# Patient Record
Sex: MALE | Race: WHITE
[De-identification: names, ages, dates, MRNs, and addresses within clinical notes are randomized per-mention and may not be internally consistent; named-entity substitution may affect disease eponyms.]

---

## 2018-04-11 NOTE — RAD
2 views left hip  4/11/2018 2:00 AM



Indication: LEG AND BACK PAIN



Comparison: None



Findings: There is no fracture or dislocation identified. Articular surfaces

are uninterrupted. Soft tissues are unremarkable.



Impression: No evidence of acute osseous abnormality

## 2018-04-11 NOTE — RAD
2 views left knee  4/11/2018 2:00 AM



Indication: LEG AND BACK PAIN



Comparison: None



Findings: There is no fracture or dislocation identified. Articular surfaces

are uninterrupted. Soft tissues are unremarkable.



Impression: No evidence of acute osseous abnormality

## 2018-04-11 NOTE — RAD
4 views of the lumbar spine 4/11/2018



Indication: Back pain



Comparison study: None



Findings: Hypoplastic ribs appear to be present at the T 12 level.  No

evidence of acute fracture or alignment abnormality is identified.  Vertebral

body heights are preserved.  There is diffuse disc space narrowing most

prominent at L4-L5 and L5-S1.  There is facet arthrosis at these levels. 

There is a grade 1 retrolisthesis of L4 on L5.  Posterior projecting

osteophyte noted.  Findings could result in neural foraminal narrowing at

L4-L5 and L5-S1.  Given degree of facet arthrosis, some component of spinal

stenosis may be present.



Impression: No acute osseous abnormality is identified.  Degenerative changes

of the lumbar spine as described most prominent at L4-L5 and L5-S1.  Consider

MRI for further evaluation if clinical concern persists.

## 2018-04-12 NOTE — ED.ADGEN
Past History


Past Medical History:  COPD, Sinusitis, Other


Past Surgical History:  No Surgical History


Smoking:  Cigarettes


Alcohol Use:  None


Drug Use:  None





Adult General


HPI


HPI





Patient is a 49 yo male who states he missed his step at home and fell down 8 

stairs.  he fell to his right knee then to his back against the stair.  he has 

known bulging disks and DDD.  he denies being on narcotics.  he was able to 

stand and walk to car.  no leg weakness, no change in sensation





Review of Systems


Review of Systems





Constitutional: Denies fever or chills []


Eyes: Denies change in visual acuity, redness, or eye pain []


HENT: Denies nasal congestion or sore throat []


Respiratory: Denies cough or shortness of breath []


Cardiovascular: No additional information not addressed in HPI []


GI: Denies abdominal pain, nausea, vomiting, bloody stools or diarrhea []


: Denies dysuria or hematuria []


Musculoskeletal:pain in lumbar spine and right knee


Integument: Denies rash or skin lesions []


Neurologic: Denies headache, focal weakness or sensory changes []


Endocrine: Denies polyuria or polydipsia []





All other systems were reviewed and found to be within normal limits, except as 

documented in this note.





Current Medications


Current Medications





Current Medications








 Medications


  (Trade)  Dose


 Ordered  Sig/Sylvester  Start Time


 Stop Time Status Last Admin


Dose Admin


 


 Fentanyl Citrate


  (Fentanyl 2ml


 Vial)  75 mcg  1X  ONCE  4/12/18 20:15


 4/12/18 20:16 DC 4/12/18 20:20


75 MCG











Allergies


Allergies





Allergies








Coded Allergies Type Severity Reaction Last Updated Verified


 


  Penicillins Allergy Severe anaphylaxis 1/7/16 Yes











Physical Exam


Physical Exam





Constitutional: Well developed, well nourished, no acute distress, non-toxic 

appearance. []


HENT: Normocephalic, atraumatic, bilateral external ears normal, oropharynx 

moist, no oral exudates, nose normal. []


Eyes: PERRLA, EOMI, conjunctiva normal, no discharge. [] 


Neck: Normal range of motion, no tenderness, supple, no stridor. no midline pain


Cardiovascular:Heart rate regular rhythm, no murmur []


Lungs & Thorax:  Bilateral breath sounds clear to auscultation []


Abdomen: Bowel sounds normal, soft, no tenderness, no masses, no pulsatile 

masses. [] 


Skin: Warm, dry, no erythema, no rash. [] 


Back: tender along the entire lumbar spine. no cervical or thoracic pain


Extremities: no edema, no swelling of right knee. no ecchymosis.  full ROM of 

right knee. no crepitus with flexion/extension. 2/4 patellar reflexes. normal 

pedal pulses. cap refill <2 sec


Neurologic: Alert and oriented X 3, normal motor function, normal sensory 

function, no focal deficits noted. []


Psychologic: Affect normal, judgement normal, mood normal. []





Current Patient Data


Vital Signs





 Vital Signs








  Date Time  Temp Pulse Resp B/P (MAP) Pulse Ox O2 Delivery O2 Flow Rate FiO2


 


4/12/18 20:20   20  96 Room Air  











EKG


EKG


[]





Radiology/Procedures


Radiology/Procedures


[]





Course & Med Decision Making


Course & Med Decision Making


Pertinent Labs and Imaging studies reviewed. (See chart for details)


no fractures seen.  he has a pcp that he is following for his back.  he will 

see him in 2 weeks. he knows to return if trouble urinating or having bowel 

movements, weakness or decreased sensation. hydrocodone for pain and flexeril 

for relaxation





[]





Final Impression


Final Impression


muscle spasm due to fall[]


Problems:  





Dragon Disclaimer


Dragon Disclaimer


This electronic medical record was generated, in whole or in part, using a 

voice recognition dictation system.





Departure


Time of Disposition:  21:39


Condition:  STABLE


Patient Instructions:  Muscle Strain





Additional Instructions:  


ice back and knee. hydrocodone for pain, ibuprofen for inflammation. flexeril 

for relaxation. return if symptoms worsen. see your doctor in 2 weeks


Prescriptions


hydrocodone and flexeril











RICARDA ROJAS MD Apr 12, 2018 21:40

## 2018-04-12 NOTE — RAD
Indication: Trauma. Lower back tenderness and pain.

 

TECHNIQUE: CT lumbar spine without IV contrast with multiplanar reformats.

 

COMPARISON: None

 

FINDINGS:

There are 5 lumbar type vertebral bodies. No compression deformities. 

Facet joints are in normal anatomic alignment. Moderate-sized anterior 

osteophytes are seen at L1-L2 and L3-L4.

 

Segmental analysis:

L1-L2: Mild circumferential disc bulge flattening anterior thecal sac. 

Mild bilateral facet arthropathy. No neuroforamina narrowing.

L2-L3: Mild circumferential disc bulge flattening intrathecal sac. Mild 

bilateral facet arthropathy. No neuroforamina narrowing.

L3-L4: Mild circumferential disc bulge flattening intrathecal sac. No 

facet arthropathy. Moderate right and left neuroforamina narrowing.

L4-L5: Circumferential disc bulge with superimposed left paracentral disc 

protrusion narrowing spinal canal. Mild bilateral facet arthropathy. 

Severe left and moderate right neuroforamina narrowing.

 

L5-S1: No disc bulge or herniation. Mild bilateral facet arthropathy. 

Severe left and moderate right neuroforamina narrowing. No acute 

fractures. Visualized soft tissues in the abdomen and pelvis are within 

normal limits.

 

IMPRESSION:

1. No acute fractures.

2. Multilevel degenerative disc disease causing varying amount of 

neuroforaminal narrowing as described above.

 

Electronically signed by: Mega Bass DO (4/12/2018 9:12 PM) [facility]

## 2018-04-13 NOTE — RAD
3 views of each knee  4/12/2018 10:04 PM



Indication: RIGHT KNEE PAIN AFTER FALL



Comparison: None



Findings: There is no fracture or dislocation identified. Articular surfaces

are uninterrupted. Soft tissues are unremarkable.



Impression: No evidence of acute osseous abnormality

## 2018-06-08 VITALS
SYSTOLIC BLOOD PRESSURE: 126 MMHG | SYSTOLIC BLOOD PRESSURE: 126 MMHG | DIASTOLIC BLOOD PRESSURE: 70 MMHG | SYSTOLIC BLOOD PRESSURE: 126 MMHG | SYSTOLIC BLOOD PRESSURE: 126 MMHG | SYSTOLIC BLOOD PRESSURE: 126 MMHG | DIASTOLIC BLOOD PRESSURE: 70 MMHG | SYSTOLIC BLOOD PRESSURE: 126 MMHG | DIASTOLIC BLOOD PRESSURE: 70 MMHG | SYSTOLIC BLOOD PRESSURE: 126 MMHG | DIASTOLIC BLOOD PRESSURE: 70 MMHG | SYSTOLIC BLOOD PRESSURE: 126 MMHG | SYSTOLIC BLOOD PRESSURE: 126 MMHG | SYSTOLIC BLOOD PRESSURE: 126 MMHG | DIASTOLIC BLOOD PRESSURE: 70 MMHG | DIASTOLIC BLOOD PRESSURE: 70 MMHG | DIASTOLIC BLOOD PRESSURE: 70 MMHG | DIASTOLIC BLOOD PRESSURE: 70 MMHG | DIASTOLIC BLOOD PRESSURE: 70 MMHG | DIASTOLIC BLOOD PRESSURE: 70 MMHG

## 2018-06-08 NOTE — ED.ADGEN
Past History


Past Medical History:  COPD, CVA, Sciatica, Sinusitis, Other


Past Surgical History:  No Surgical History, Other


Smoking:  Cigarettes


Alcohol Use:  None


Drug Use:  None





Adult General


Chief Complaint


Chief Complaint


".. I have chronic low back pain... "





[facility]


HPI





Patient is a 48 year old male who presents with above hx and complaints of 

exacerbation of his chronic back pain.  Pt. denies any injury in the last 

couple days. Patient denies any fever or chills. Patient denies any IV drug 

use. Patient denies any history of immunosuppression. Patient denies any travel 

or specific ill contacts. Patient has been seen in pain clinic .   Patient 

denies any problems with defecation or urination. Patient states that bending 

or lifting does exacerbate pain. Patient's pain follows the left sciatic nerve 

is previous exacerbations. Patient has had a myelogram with MRI which showed 

degenerative joint disease and disc degenerative changes.  She does follow with 

Andriy and Dr Blake.   Pt. does continue to smoke.





Review of Systems


Review of Systems





Constitutional: Denies fever or chills []


Eyes: Denies change in visual acuity, redness, or eye pain []


HENT: Denies nasal congestion or sore throat []


Respiratory: Denies cough or shortness of breath []


Cardiovascular: No additional information not addressed in HPI []


GI: Denies abdominal pain, nausea, vomiting, bloody stools or diarrhea []


: Denies dysuria or hematuria [


Integument: Denies rash or skin lesions []


Neurologic: Denies headache, focal weakness or sensory changes []


Endocrine: Denies polyuria or polydipsia []





All other systems were reviewed and found to be within normal limits, except as 

documented in this note.





Family History


Family History


Non-contributory





Current Medications


Current Medications





Current Medications








 Medications


  (Trade)  Dose


 Ordered  Sig/Sylvester  Start Time


 Stop Time Status Last Admin


Dose Admin


 


 Ketorolac


 Tromethamine


  (Toradol)  60 mg  1X  ONCE  6/8/18 22:15


 6/8/18 22:16 DC 6/8/18 22:11


60 MG


 


 Lorazepam


  (Ativan)  2 mg  1X  ONCE  6/8/18 22:15


 6/8/18 22:16 DC 6/8/18 22:08


2 MG


 


 Methylprednisolone


 Acetate


  (DEPO-Medrol IM)  40 mg  1X  ONCE  6/8/18 22:15


 6/8/18 22:16 DC 6/8/18 22:10


40 MG


 


 Morphine Sulfate


  (Morphine 10mg


 Syringe)  10 mg  1X  ONCE  6/8/18 22:15


 6/8/18 22:16 DC 6/8/18 22:15


10 MG


 


 Orphenadrine


 Citrate


  (Norflex)  60 mg  1X  ONCE  6/8/18 22:15


 6/8/18 22:16 DC 6/8/18 22:13


60 MG











Allergies


Allergies





Allergies








Coded Allergies Type Severity Reaction Last Updated Verified


 


  Penicillins Allergy Severe anaphylaxis 1/7/16 Yes











Physical Exam


Physical Exam





Constitutional: Moderately  acute distress, non-toxic appearance. []


HENT: Normocephalic, atraumatic, bilateral external ears normal, oropharynx 

moist, no oral exudates, nose normal. []


Eyes: PERRLA, EOMI, conjunctiva normal, no discharge. [] 


Neck: Normal range of motion, no tenderness, supple, no stridor. [] 


Cardiovascular:Heart rate regular rhythm, no murmur []


Lungs & Thorax:  Bilateral breath sounds with scattered wheezing on  

auscultation []


Abdomen: Bowel sounds normal, soft, no tenderness, no masses, no pulsatile 

masses.  Obese.  Pt. decline rectal exam at this time. 


Skin: Warm, dry, no erythema, no rash. [] 


Back:Lumbar muscle  tenderness, no CVA tenderness. [] Pain that follows the Lt. 

sciatic nerve. Note muscles spasms in lumbar. 


Extremities: No tenderness, no cyanosis, no clubbing, ROM intact, no edema. [] 


Neurologic: Alert and oriented X 3, No gross motor function deficits,  no gross 

sensory function deficits no focal deficits noted. []No saddle loss.  DTR + 2 

patella and ankle.   


Psychologic: Affect anxious, judgement normal, mood normal. []





Current Patient Data


Vital Signs





 Vital Signs








  Date Time  Temp Pulse Resp B/P (MAP) Pulse Ox O2 Delivery O2 Flow Rate FiO2


 


6/8/18 22:15  74 16 126/70 (88) 97 Room Air  


 


6/8/18 21:35 98.2       











EKG


EKG


[]





Radiology/Procedures


Radiology/Procedures


[]





Course & Med Decision Making


Course & Med Decision Making


Pertinent Labs and Imaging studies reviewed. (See chart for details).  Patient 

follow-up as scheduled with his pain clinic and primary. Patient take home meds 

as directed. Patient to use ice packs as needed. Patient to take Tylenol and 

ibuprofen for pain. For marked pain may take Vicoprofen up 4 times a day. 

Patient must follow-up. Patient encouraged to stop smoking. Patient return if 

any concerns.





[]





Final Impression


Final Impression


1. Acute on Chronic Back pain[]-sciatica


2. COPD


3. Tobacco Abuse





Dragon Disclaimer


Dragon Disclaimer


This electronic medical record was generated, in whole or in part, using a 

voice recognition dictation system.











DELBERT CEJA MD Jun 8, 2018 21:42

## 2018-10-17 ENCOUNTER — HOSPITAL ENCOUNTER (OUTPATIENT)
Dept: HOSPITAL 63 - ECHO | Age: 48
Discharge: HOME | End: 2018-10-17
Payer: MEDICARE

## 2018-10-17 DIAGNOSIS — F17.290: ICD-10-CM

## 2018-10-17 DIAGNOSIS — I25.119: Primary | ICD-10-CM

## 2018-10-17 DIAGNOSIS — E66.8: ICD-10-CM

## 2018-10-17 PROCEDURE — 93306 TTE W/DOPPLER COMPLETE: CPT

## 2018-10-17 NOTE — CARD
MR#: [id number]

Account#: [id number]

Accession#: [id number]

Date of Study: 10/17/2018

Ordering Physician: SHAHBAZ HAND, 

Referring Physician: SHAHBAZ HAND 

Tech: Gissel Gloria RDCS





--------------- APPROVED REPORT --------------





EXAM: Two-dimensional and M-mode echocardiogram with Doppler and color Doppler.



Other Information 

Quality : Fair



INDICATION

Cardiac Disease: CAD 



RISK FACTORS

Obesity   



2D DIMENSIONS 

RVDd2.8 (2.9-3.5cm)Left Atrium(2D)3.8 (1.6-4.0cm)

IVSd1.0 (0.7-1.1cm)Aortic Root(2D)3.3 (2.0-3.7cm)

LVDd5.1 (3.9-5.9cm)LVOT Diameter2.2 (1.8-2.4cm)

PWd1.1 (0.7-1.1cm)LVDs3.9 (2.5-4.0cm)

FS (%) 25.0 %SV57.9 ml



Aortic Valve

AoV Peak Demetrio.156.0cm/sAoV VTI22.8cm

AO Peak GR.9.7mmHgLVOT Peak Demetrio.120.7cm/s

LVOT  VTI 21.36cmAO Mean GR.4mmHg

FAUZIA (VMAX)2.80xa3JFU   (VTI)3.46cm2



Mitral Valve

MV E Exhdxwtb28.7cm/sMV DECEL GBOG219tr

MV A Tzrczkjv28.6cm/sE/A  Ratio1.4



Pulmonary Valve

PV Peak Gfbnhfkn675.2cm/sPV Peak Grad.17mmHg



Pulmonary Vein

S1 Ozwaqnjd38.5cm/sD2 Oqsumsnk92.1cm/s



 LEFT VENTRICLE 

The left ventricle is normal size. There is normal left ventricular wall thickness. Left ventricle sy
stolic function is normal. The Ejection Fraction is 55-60%. There is normal LV segmental wall motion.
 Transmitral Doppler flow pattern is Grade II-pseudonormal filling dynamics.



 RIGHT VENTRICLE 

The right ventricle is normal size. The right ventricular systolic function is normal.



 ATRIA 

The left atrium size is normal. The right atrium size is normal. The interatrial septum is intact wit
h no evidence for an atrial septal defect or patent foramen ovale as noted on 2-D or Doppler imaging.




 AORTIC VALVE 

The aortic valve is not well visualized but appears to be functioning normally by Doppler interrogati
on. Doppler and Color Flow revealed no significant aortic regurgitation. There is no significant aort
ic valvular stenosis.



 MITRAL VALVE 

The mitral valve is normal in structure and function. There is no evidence of mitral valve prolapse. 
There is no mitral valve stenosis. Doppler and Color Flow revealed no mitral valve regurgitation note
d.



 TRICUSPID VALVE 

The tricuspid valve is normal in structure and function. Doppler and Color Flow revealed trace tricus
pid valve regurgitation. There is no tricuspid valve stenosis.



 PULMONIC VALVE 

The pulmonic valve is not well visualized. Doppler and Color Flow revealed no pulmonic valvular regur
gitation. There is no pulmonic valvular stenosis.



 GREAT VESSELS 

The aortic root is normal in size. The ascending aorta is not well seen. The IVC was not visualized.



 PERICARDIAL EFFUSION 

There is no evidence of significant pericardial effusion.



Critical Notification

Critical Value: No



<Conclusion>

The left ventricle is normal size.

Left ventricle systolic function is normal.

The Ejection Fraction is 55-60%.

There is no significant aortic valvular stenosis.

Doppler and Color Flow revealed no significant aortic regurgitation.

Doppler and Color Flow revealed no mitral valve regurgitation noted.

Doppler and Color Flow revealed trace tricuspid valve regurgitation.



Signed by : Rafy Dial MD

Electronically Approved : 10/17/2018 15:19:12

## 2018-10-26 ENCOUNTER — HOSPITAL ENCOUNTER (OUTPATIENT)
Dept: HOSPITAL 63 - SURG | Age: 48
Discharge: HOME | End: 2018-10-26
Attending: ANESTHESIOLOGY
Payer: MEDICARE

## 2018-10-26 DIAGNOSIS — I10: ICD-10-CM

## 2018-10-26 DIAGNOSIS — F17.210: ICD-10-CM

## 2018-10-26 DIAGNOSIS — Z88.0: ICD-10-CM

## 2018-10-26 DIAGNOSIS — I25.10: ICD-10-CM

## 2018-10-26 DIAGNOSIS — G47.30: ICD-10-CM

## 2018-10-26 DIAGNOSIS — Z79.82: ICD-10-CM

## 2018-10-26 DIAGNOSIS — J44.9: ICD-10-CM

## 2018-10-26 DIAGNOSIS — M47.26: Primary | ICD-10-CM

## 2018-10-26 DIAGNOSIS — Z86.73: ICD-10-CM

## 2018-10-26 DIAGNOSIS — Z79.899: ICD-10-CM

## 2018-10-26 DIAGNOSIS — M19.90: ICD-10-CM

## 2018-10-26 PROCEDURE — 62323 NJX INTERLAMINAR LMBR/SAC: CPT

## 2018-12-06 ENCOUNTER — HOSPITAL ENCOUNTER (OUTPATIENT)
Dept: HOSPITAL 63 - SURG | Age: 48
Discharge: HOME | End: 2018-12-06
Attending: ANESTHESIOLOGY
Payer: MEDICARE

## 2018-12-06 DIAGNOSIS — M47.26: Primary | ICD-10-CM

## 2018-12-06 DIAGNOSIS — Z88.0: ICD-10-CM

## 2018-12-06 DIAGNOSIS — G47.30: ICD-10-CM

## 2018-12-06 DIAGNOSIS — J44.9: ICD-10-CM

## 2018-12-06 DIAGNOSIS — I10: ICD-10-CM

## 2018-12-06 DIAGNOSIS — M19.90: ICD-10-CM

## 2018-12-06 DIAGNOSIS — F11.90: ICD-10-CM

## 2018-12-06 DIAGNOSIS — G89.4: ICD-10-CM

## 2018-12-06 DIAGNOSIS — Z79.82: ICD-10-CM

## 2018-12-06 DIAGNOSIS — I25.10: ICD-10-CM

## 2018-12-06 DIAGNOSIS — Z79.899: ICD-10-CM

## 2018-12-06 DIAGNOSIS — F17.210: ICD-10-CM

## 2018-12-06 PROCEDURE — 62323 NJX INTERLAMINAR LMBR/SAC: CPT

## 2019-01-24 ENCOUNTER — HOSPITAL ENCOUNTER (OUTPATIENT)
Dept: HOSPITAL 63 - SURG | Age: 49
Discharge: HOME | End: 2019-01-24
Attending: ANESTHESIOLOGY
Payer: MEDICARE

## 2019-01-24 DIAGNOSIS — Z88.0: ICD-10-CM

## 2019-01-24 DIAGNOSIS — Z86.73: ICD-10-CM

## 2019-01-24 DIAGNOSIS — J44.9: ICD-10-CM

## 2019-01-24 DIAGNOSIS — G47.30: ICD-10-CM

## 2019-01-24 DIAGNOSIS — M19.90: ICD-10-CM

## 2019-01-24 DIAGNOSIS — Z79.899: ICD-10-CM

## 2019-01-24 DIAGNOSIS — F17.210: ICD-10-CM

## 2019-01-24 DIAGNOSIS — I25.10: ICD-10-CM

## 2019-01-24 DIAGNOSIS — I10: ICD-10-CM

## 2019-01-24 DIAGNOSIS — Z79.82: ICD-10-CM

## 2019-01-24 DIAGNOSIS — M47.26: Primary | ICD-10-CM

## 2019-01-24 PROCEDURE — 62323 NJX INTERLAMINAR LMBR/SAC: CPT

## 2019-02-21 ENCOUNTER — HOSPITAL ENCOUNTER (OUTPATIENT)
Dept: HOSPITAL 63 - SURG | Age: 49
Discharge: HOME | End: 2019-02-21
Attending: ANESTHESIOLOGY
Payer: MEDICARE

## 2019-02-21 DIAGNOSIS — J44.9: ICD-10-CM

## 2019-02-21 DIAGNOSIS — M19.90: ICD-10-CM

## 2019-02-21 DIAGNOSIS — G47.30: ICD-10-CM

## 2019-02-21 DIAGNOSIS — K44.9: ICD-10-CM

## 2019-02-21 DIAGNOSIS — F11.90: ICD-10-CM

## 2019-02-21 DIAGNOSIS — G89.4: ICD-10-CM

## 2019-02-21 DIAGNOSIS — I10: ICD-10-CM

## 2019-02-21 DIAGNOSIS — M54.16: Primary | ICD-10-CM

## 2019-02-21 PROCEDURE — 99214 OFFICE O/P EST MOD 30 MIN: CPT

## 2019-03-29 ENCOUNTER — HOSPITAL ENCOUNTER (OUTPATIENT)
Dept: HOSPITAL 63 - SURG | Age: 49
Discharge: HOME | End: 2019-03-29
Attending: ANESTHESIOLOGY
Payer: MEDICARE

## 2019-03-29 DIAGNOSIS — M54.16: Primary | ICD-10-CM

## 2019-03-29 DIAGNOSIS — I10: ICD-10-CM

## 2019-03-29 DIAGNOSIS — M19.90: ICD-10-CM

## 2019-03-29 DIAGNOSIS — Z87.891: ICD-10-CM

## 2019-03-29 DIAGNOSIS — F11.90: ICD-10-CM

## 2019-03-29 DIAGNOSIS — J44.9: ICD-10-CM

## 2019-03-29 DIAGNOSIS — G47.30: ICD-10-CM

## 2019-03-29 DIAGNOSIS — I25.10: ICD-10-CM

## 2019-03-29 DIAGNOSIS — G89.4: ICD-10-CM

## 2019-03-29 PROCEDURE — 99214 OFFICE O/P EST MOD 30 MIN: CPT

## 2019-04-23 ENCOUNTER — HOSPITAL ENCOUNTER (OUTPATIENT)
Dept: HOSPITAL 63 - PMG | Age: 49
Discharge: HOME | End: 2019-04-23
Attending: PHYSICIAN ASSISTANT
Payer: MEDICARE

## 2019-04-23 DIAGNOSIS — M25.562: Primary | ICD-10-CM

## 2019-04-23 PROCEDURE — 73560 X-RAY EXAM OF KNEE 1 OR 2: CPT

## 2019-04-23 NOTE — RAD
2 views of the left knee without comparison for pain in left knee, no 

known injury.

 

FINDINGS: There is no fracture, dislocation, or acute osseous abnormality 

identified. Joints and soft tissues are unremarkable. No suprapatellar 

joint effusion. No significant degenerative changes.

 

IMPRESSION:

1. No acute osseous abnormality of the left knee.

 

Electronically signed by: Jose Burns MD (4/23/2019 4:15 PM) [id number]

## 2019-05-03 ENCOUNTER — HOSPITAL ENCOUNTER (OUTPATIENT)
Dept: HOSPITAL 63 - SURG | Age: 49
Discharge: HOME | End: 2019-05-03
Attending: ANESTHESIOLOGY
Payer: MEDICARE

## 2019-05-03 DIAGNOSIS — I10: ICD-10-CM

## 2019-05-03 DIAGNOSIS — I25.10: ICD-10-CM

## 2019-05-03 DIAGNOSIS — J44.9: ICD-10-CM

## 2019-05-03 DIAGNOSIS — M54.16: Primary | ICD-10-CM

## 2019-05-03 DIAGNOSIS — Z86.73: ICD-10-CM

## 2019-05-03 DIAGNOSIS — F17.210: ICD-10-CM

## 2019-05-03 PROCEDURE — 62323 NJX INTERLAMINAR LMBR/SAC: CPT

## 2019-05-24 VITALS
SYSTOLIC BLOOD PRESSURE: 121 MMHG | SYSTOLIC BLOOD PRESSURE: 121 MMHG | DIASTOLIC BLOOD PRESSURE: 71 MMHG | DIASTOLIC BLOOD PRESSURE: 71 MMHG | DIASTOLIC BLOOD PRESSURE: 71 MMHG | SYSTOLIC BLOOD PRESSURE: 121 MMHG

## 2019-06-18 ENCOUNTER — HOSPITAL ENCOUNTER (OUTPATIENT)
Dept: HOSPITAL 61 - US | Age: 49
Discharge: HOME | End: 2019-06-18
Attending: INTERNAL MEDICINE
Payer: MEDICARE

## 2019-06-18 DIAGNOSIS — I87.2: Primary | ICD-10-CM

## 2019-06-18 PROCEDURE — 93970 EXTREMITY STUDY: CPT

## 2019-06-18 NOTE — RAD
MR#: [id number]

Account#: [id number]

Accession#: [id number]

Date of Study: 06/18/2019

Ordering Physician: SHAHBAZ HAND, 

Referring Physician: SHAHBAZ HAND, 

Tech: Joseph Davis MBA, RDMS, RVT, RDCS, RTR





--------------- APPROVED REPORT --------------





Patient Location : OUT-PATIENT



Indications

venous insufficiency



Findings

Limited Gray scale images of the bilateral lower extremity SFJ are grossly unremarkable. 



The R GSV measures 6.7 mm and does not reflux

The L GSV measures 7.5 mm and does not reflux. 



Bilateral lesser saphenous veins do not show any evidence of reflux. 



Critical Notification

Critical Value: No



<Conclusion>

1. No significant reflux in the bilateral greater and lesser saphenous veins



Signed by : Octaviano Aponte, 

Electronically Approved : 06/18/2019 12:52:18

## 2019-06-28 ENCOUNTER — HOSPITAL ENCOUNTER (OUTPATIENT)
Dept: HOSPITAL 63 - SURG | Age: 49
End: 2019-06-28
Attending: ANESTHESIOLOGY
Payer: MEDICARE

## 2019-06-28 DIAGNOSIS — Z86.73: ICD-10-CM

## 2019-06-28 DIAGNOSIS — I10: ICD-10-CM

## 2019-06-28 DIAGNOSIS — I25.10: ICD-10-CM

## 2019-06-28 DIAGNOSIS — M54.16: Primary | ICD-10-CM

## 2019-06-28 DIAGNOSIS — Z87.891: ICD-10-CM

## 2019-06-28 DIAGNOSIS — J44.9: ICD-10-CM

## 2019-06-28 DIAGNOSIS — Z88.0: ICD-10-CM

## 2019-06-28 PROCEDURE — 62323 NJX INTERLAMINAR LMBR/SAC: CPT

## 2019-07-16 ENCOUNTER — HOSPITAL ENCOUNTER (OUTPATIENT)
Dept: HOSPITAL 63 - DXRAD | Age: 49
Discharge: HOME | End: 2019-07-16
Payer: MEDICARE

## 2019-07-16 DIAGNOSIS — R06.02: ICD-10-CM

## 2019-07-16 DIAGNOSIS — J44.9: Primary | ICD-10-CM

## 2019-07-16 PROCEDURE — 71046 X-RAY EXAM CHEST 2 VIEWS: CPT

## 2019-07-16 NOTE — RAD
AP and Lateral Views of the Chest  7/16/2019 12:00 AM

 

Indication: Shortness of breath, COPD

 

Comparison: Chest radiograph ever 25th 2016

 

Findings: There is no focal consolidation or infiltrate identified. The 

cardiomediastinal silhouette is within normal limits. There is no evidence

of pneumothorax or pleural effusion. No acute osseous abnormalities are 

identified.  

 

Impression: No evidence of acute cardiopulmonary process. 

 

Electronically signed by: Audi Pike MD (7/16/2019 10:35 AM) [facility]

## 2019-08-24 ENCOUNTER — HOSPITAL ENCOUNTER (EMERGENCY)
Dept: HOSPITAL 63 - ER | Age: 49
LOS: 1 days | Discharge: HOME | End: 2019-08-25
Payer: MEDICARE

## 2019-08-24 VITALS — HEIGHT: 73 IN | WEIGHT: 315 LBS | BODY MASS INDEX: 41.75 KG/M2

## 2019-08-24 DIAGNOSIS — J44.9: ICD-10-CM

## 2019-08-24 DIAGNOSIS — Z88.0: ICD-10-CM

## 2019-08-24 DIAGNOSIS — F17.210: ICD-10-CM

## 2019-08-24 DIAGNOSIS — Z86.73: ICD-10-CM

## 2019-08-24 DIAGNOSIS — R07.2: Primary | ICD-10-CM

## 2019-08-24 LAB
ALBUMIN SERPL-MCNC: 3.5 G/DL (ref 3.4–5)
ALBUMIN/GLOB SERPL: 1.1 {RATIO} (ref 1–1.7)
ALP SERPL-CCNC: 66 U/L (ref 46–116)
ALT SERPL-CCNC: 35 U/L (ref 16–63)
ANION GAP SERPL CALC-SCNC: 8 MMOL/L (ref 6–14)
AST SERPL-CCNC: 19 U/L (ref 15–37)
BASOPHILS # BLD AUTO: 0.1 X10^3/UL (ref 0–0.2)
BASOPHILS NFR BLD: 2 % (ref 0–3)
BILIRUB SERPL-MCNC: 0.5 MG/DL (ref 0.2–1)
BUN/CREAT SERPL: 8 (ref 6–20)
CA-I SERPL ISE-MCNC: 12 MG/DL (ref 8–26)
CALCIUM SERPL-MCNC: 8.1 MG/DL (ref 8.5–10.1)
CHLORIDE SERPL-SCNC: 102 MMOL/L (ref 98–107)
CO2 SERPL-SCNC: 28 MMOL/L (ref 21–32)
CREAT SERPL-MCNC: 1.6 MG/DL (ref 0.7–1.3)
EOSINOPHIL NFR BLD: 0.1 X10^3/UL (ref 0–0.7)
EOSINOPHIL NFR BLD: 2 % (ref 0–3)
ERYTHROCYTE [DISTWIDTH] IN BLOOD BY AUTOMATED COUNT: 13.9 % (ref 11.5–14.5)
GFR SERPLBLD BASED ON 1.73 SQ M-ARVRAT: 46.4 ML/MIN
GLOBULIN SER-MCNC: 3.1 G/DL (ref 2.2–3.8)
GLUCOSE SERPL-MCNC: 159 MG/DL (ref 70–99)
HCT VFR BLD CALC: 44.8 % (ref 39–53)
HGB BLD-MCNC: 15.7 G/DL (ref 13–17.5)
LYMPHOCYTES # BLD: 1.6 X10^3/UL (ref 1–4.8)
LYMPHOCYTES NFR BLD AUTO: 20 % (ref 24–48)
MCH RBC QN AUTO: 31 PG (ref 25–35)
MCHC RBC AUTO-ENTMCNC: 35 G/DL (ref 31–37)
MCV RBC AUTO: 88 FL (ref 79–100)
MONO #: 0.4 X10^3/UL (ref 0–1.1)
MONOCYTES NFR BLD: 5 % (ref 0–9)
NEUT #: 5.9 X10^3UL (ref 1.8–7.7)
NEUTROPHILS NFR BLD AUTO: 72 % (ref 31–73)
PLATELET # BLD AUTO: 194 X10^3/UL (ref 140–400)
POTASSIUM SERPL-SCNC: 3.7 MMOL/L (ref 3.5–5.1)
PROT SERPL-MCNC: 6.6 G/DL (ref 6.4–8.2)
RBC # BLD AUTO: 5.12 X10^6/UL (ref 4.3–5.7)
SODIUM SERPL-SCNC: 138 MMOL/L (ref 136–145)
WBC # BLD AUTO: 8.1 X10^3/UL (ref 4–11)

## 2019-08-24 PROCEDURE — 80053 COMPREHEN METABOLIC PANEL: CPT

## 2019-08-24 PROCEDURE — 85025 COMPLETE CBC W/AUTO DIFF WBC: CPT

## 2019-08-24 PROCEDURE — 93005 ELECTROCARDIOGRAM TRACING: CPT

## 2019-08-24 PROCEDURE — 36415 COLL VENOUS BLD VENIPUNCTURE: CPT

## 2019-08-24 PROCEDURE — 71046 X-RAY EXAM CHEST 2 VIEWS: CPT

## 2019-08-24 PROCEDURE — 84484 ASSAY OF TROPONIN QUANT: CPT

## 2019-08-24 RX ADMIN — NITROGLYCERIN PRN MG: 0.4 TABLET SUBLINGUAL at 23:45

## 2019-08-24 NOTE — RAD
Chest PA and lateral:

 

Reason for examination: Chest pain.

 

Comparison is made to previous study dated 7/16/2019.

 

The heart size is normal. Mediastinum is unremarkable. Lung fields are 

clear. No acute bony abnormalities are seen.

 

Impression:

 

No acute cardiopulmonary disease.

 

Electronically signed by: Moriah Alexander MD (8/24/2019 11:50 PM) [facility]

## 2019-08-24 NOTE — PHYS DOC
Past History


Past Medical History:  COPD, CVA, Sciatica, Sinusitis, Other


Past Surgical History:  No Surgical History, Other


Smoking:  Cigarettes


Alcohol Use:  None


Drug Use:  None





Adult General


Chief Complaint


Chief Complaint:  CHEST PAIN





[facility]


HPI


49-year-old male presents with chest pain. Patient states that he had chest pain

that started this morning around 9 AM there was a sharp, 10 out of 10 pain. He 

did not come to the hospital at that time. He decided to see how ago. He has had

pain throughout the day but is still a sharp sensation on the right side of his 

chest. It is now 6 out of 10. He denies shortness of breath or diaphoresis. 

Patient has a cardiac history. He said to minor heart attacks and was diagnosed 

with coronary artery disease. He had a stress test within the last year that he 

states they were watching some things, but no interventions were advised at that

time. Patient denies fever or chills. He denies trauma or falls. He has high 

blood pressure, hyperlipidemia, obesity, and coronary artery disease.





Review of Systems


Review of Systems





Constitutional: Denies fever or chills []


Eyes: Denies change in visual acuity, redness, or eye pain []


HENT: Denies nasal congestion or sore throat []


Respiratory: Denies cough or shortness of breath []


Cardiovascular: No additional information not addressed in HPI []


GI: Denies abdominal pain, nausea, vomiting, bloody stools or diarrhea []


: Denies dysuria or hematuria []


Musculoskeletal: Denies back pain or joint pain []


Integument: Denies rash or skin lesions []


Neurologic: Denies headache, focal weakness or sensory changes []


Endocrine: Denies polyuria or polydipsia []





All other systems were reviewed and found to be within normal limits, except as 

documented in this note.





Allergies


Allergies





Allergies








Coded Allergies Type Severity Reaction Last Updated Verified


 


  Penicillins Allergy Severe anaphylaxis 1/7/16 Yes











Physical Exam


Physical Exam





Constitutional: Well developed, morbid obesity, well nourished, no acute 

distress, non-toxic appearance. []


HENT: Normocephalic, atraumatic, bilateral external ears normal, oropharynx 

moist, no oral exudates, nose normal. []


Eyes: PERRLA, EOMI, conjunctiva normal, no discharge. [] 


Neck: Normal range of motion, no tenderness, supple, no stridor. [] 


Cardiovascular:Heart rate regular rhythm, no murmur []


Lungs & Thorax:  Bilateral breath sounds clear to auscultation []


Abdomen: Bowel sounds normal, soft, no tenderness, no masses, no pulsatile 

masses. [] 


Skin: Warm, dry, no erythema, no rash. [] 


Back: No tenderness, no CVA tenderness. [] 


Extremities: No tenderness, no cyanosis, no clubbing, ROM intact, no edema. [] 


Neurologic: Alert and oriented X 3, normal motor function, normal sensory 

function, no focal deficits noted. []


Psychologic: Affect normal, judgement normal, mood normal. []





EKG


EKG


Sinus rhythm, normal axis, no ST elevations or depressions.[]





Radiology/Procedures


Radiology/Procedures


[]


Impressions:


Chest PA and lateral:


 


Reason for examination: Chest pain.


 


Comparison is made to previous study dated 7/16/2019.


 


The heart size is normal. Mediastinum is unremarkable. Lung fields are 


clear. No acute bony abnormalities are seen.


 


Impression:


 


No acute cardiopulmonary disease.


 


Electronically signed by: Moriah Abdul MD (8/24/2019 11:50 PM) [facility]














DICTATED AND SIGNED BY:     MORIAH ABDUL MD


DATE:     08/24/19 5312





CC: EREN MILLER DO; RADHA FLOWERS PA ~





Course & Med Decision Making


Course & Med Decision Making


Pertinent Labs and Imaging studies reviewed. (See chart for details)


The patient's labs are remarkable for an elevated creatinine 1.6. Review of his 

chart shows a 1.3 1.5 is normal for him. The patient's troponin is negative. His

 EKG is unremarkable. His chest x-ray is negative for acute findings. His heart 

score is 4. The patient was given aspirin on arrival. He was also given 2 doses 

of nitroglycerin which has improved his pain significantly. The patient is 

willing to be admitted. As EMS arrived, the patient changed his mind and really 

would prefer to go home. I stressed to him the increased risk of this decision. 

He states verbal understanding. He is of sound mind to make this decision in my 

opinion. He will be discharged. If his symptoms return he will come back to the 

emergency room.





Dragon Disclaimer


Dragon Disclaimer


This electronic medical record was generated, in whole or in part, using a voice

 recognition dictation system.





The HEART Score for CP Pts


HEART Score for Chest Pain:  








HEART Score for Chest Pain Response (Comments) Value


 


History Moderately Suspicious 1


 


ECG Normal 0


 


Age >45 - < 65 1


 


Risk Factors >3 Risk Factors or Hx CAD 2


 


Troponin < Normal Limit 0


 


Total  4








Risk Factors:


Risk Factors:  DM, Current or recent (<one month) smoker, HTN, HLP, family 

history of CAD, obesity.


Risk Scores:


Score 0 - 3:  2.5% MACE over next 6 weeks - Discharge Home


Score 4 - 6:  20.3% MACE over next 6 weeks - Admit for Clinical Observation


Score 7 - 10:  72.7% MACE over next 6 weeks - Early Invasive Strategies





Departure


Departure:


Impression:  


   Primary Impression:  


   Chest pain, precordial


Disposition:  01 HOME, SELF-CARE


Admitting Physician:  Tana Zuñiga


Condition:  GUARDED


Referrals:  


RADHA FLOWERS (PCP)


Patient Instructions:  Chest Pain (Nonspecific), Easy-to-Read











EREN MILLER DO                 Aug 24, 2019 23:34

## 2019-08-25 VITALS — SYSTOLIC BLOOD PRESSURE: 116 MMHG | DIASTOLIC BLOOD PRESSURE: 70 MMHG

## 2019-08-25 RX ADMIN — NITROGLYCERIN PRN MG: 0.4 TABLET SUBLINGUAL at 00:01

## 2019-08-25 NOTE — EKG
[facility] [address], [place] [address]

Test Date:    2019               Test Time:    23:12:59

Pat Name:     ADELFO REDMAN              Department:   

Patient ID:   [id number]           Room:          

Gender:       M                        Technician:   

:          1970               Requested By: EREN MILLER

Order Number: [id number]            Reading MD:   Octaviano Aponte MD

                                 Measurements

Intervals                              Axis          

Rate:         82                       P:            90

AR:           136                      QRS:          73

QRSD:         94                       T:            36

QT:           362                                    

QTc:          426                                    

                           Interpretive Statements

SINUS RHYTHM

NON-SPECIFIC ST/T CHANGES

Electronically Signed On 2019 10:26:26 CDT by Octaviano Aponte MD

## 2019-10-04 ENCOUNTER — HOSPITAL ENCOUNTER (EMERGENCY)
Dept: HOSPITAL 63 - ER | Age: 49
Discharge: HOME | End: 2019-10-04
Payer: MEDICARE

## 2019-10-04 VITALS — BODY MASS INDEX: 41.75 KG/M2 | HEIGHT: 73 IN | WEIGHT: 315 LBS

## 2019-10-04 VITALS — SYSTOLIC BLOOD PRESSURE: 144 MMHG | DIASTOLIC BLOOD PRESSURE: 72 MMHG

## 2019-10-04 DIAGNOSIS — I10: ICD-10-CM

## 2019-10-04 DIAGNOSIS — I25.10: ICD-10-CM

## 2019-10-04 DIAGNOSIS — R19.7: ICD-10-CM

## 2019-10-04 DIAGNOSIS — K21.9: ICD-10-CM

## 2019-10-04 DIAGNOSIS — F17.210: ICD-10-CM

## 2019-10-04 DIAGNOSIS — J44.1: Primary | ICD-10-CM

## 2019-10-04 DIAGNOSIS — I25.2: ICD-10-CM

## 2019-10-04 DIAGNOSIS — Z88.0: ICD-10-CM

## 2019-10-04 LAB
ALBUMIN SERPL-MCNC: 4.3 G/DL (ref 3.4–5)
ALBUMIN/GLOB SERPL: 1.2 {RATIO} (ref 1–1.7)
ALP SERPL-CCNC: 70 U/L (ref 46–116)
ALT SERPL-CCNC: 37 U/L (ref 16–63)
AMPHETAMINE/METHAMPHETAMINE: (no result)
ANION GAP SERPL CALC-SCNC: 13 MMOL/L (ref 6–14)
APTT PPP: YELLOW S
AST SERPL-CCNC: 36 U/L (ref 15–37)
BACTERIA #/AREA URNS HPF: 0 /HPF
BARBITURATES UR-MCNC: (no result) UG/ML
BASOPHILS # BLD AUTO: 0.1 X10^3/UL (ref 0–0.2)
BASOPHILS NFR BLD: 1 % (ref 0–3)
BENZODIAZ UR-MCNC: (no result) UG/L
BILIRUB SERPL-MCNC: 0.9 MG/DL (ref 0.2–1)
BILIRUB UR QL STRIP: (no result)
BUN/CREAT SERPL: 9 (ref 6–20)
CA-I SERPL ISE-MCNC: 12 MG/DL (ref 8–26)
CALCIUM SERPL-MCNC: 9.5 MG/DL (ref 8.5–10.1)
CANNABINOIDS UR-MCNC: (no result) UG/L
CHLORIDE SERPL-SCNC: 99 MMOL/L (ref 98–107)
CO2 SERPL-SCNC: 23 MMOL/L (ref 21–32)
COCAINE UR-MCNC: (no result) NG/ML
CREAT SERPL-MCNC: 1.3 MG/DL (ref 0.7–1.3)
EOSINOPHIL NFR BLD: 0.1 X10^3/UL (ref 0–0.7)
EOSINOPHIL NFR BLD: 1 % (ref 0–3)
ERYTHROCYTE [DISTWIDTH] IN BLOOD BY AUTOMATED COUNT: 14.4 % (ref 11.5–14.5)
FIBRINOGEN PPP-MCNC: CLEAR MG/DL
GFR SERPLBLD BASED ON 1.73 SQ M-ARVRAT: 58.9 ML/MIN
GLOBULIN SER-MCNC: 3.5 G/DL (ref 2.2–3.8)
GLUCOSE SERPL-MCNC: 153 MG/DL (ref 70–99)
GLUCOSE UR STRIP-MCNC: (no result) MG/DL
HCT VFR BLD CALC: 51.5 % (ref 39–53)
HGB BLD-MCNC: 17.6 G/DL (ref 13–17.5)
INFLUENZA A PATIENT: NEGATIVE
INFLUENZA B PATIENT: NEGATIVE
LIPASE: 71 U/L (ref 73–393)
LYMPHOCYTES # BLD: 1 X10^3/UL (ref 1–4.8)
LYMPHOCYTES NFR BLD AUTO: 10 % (ref 24–48)
MCH RBC QN AUTO: 30 PG (ref 25–35)
MCHC RBC AUTO-ENTMCNC: 34 G/DL (ref 31–37)
MCV RBC AUTO: 88 FL (ref 79–100)
METHADONE SERPL-MCNC: (no result) NG/ML
MONO #: 0.4 X10^3/UL (ref 0–1.1)
MONOCYTES NFR BLD: 4 % (ref 0–9)
NEUT #: 8.5 X10^3UL (ref 1.8–7.7)
NEUTROPHILS NFR BLD AUTO: 85 % (ref 31–73)
NITRITE UR QL STRIP: (no result)
OPIATES UR-MCNC: (no result) NG/ML
PCP SERPL-MCNC: (no result) MG/DL
PLATELET # BLD AUTO: 237 X10^3/UL (ref 140–400)
POTASSIUM SERPL-SCNC: 4.3 MMOL/L (ref 3.5–5.1)
PROT SERPL-MCNC: 7.8 G/DL (ref 6.4–8.2)
RBC # BLD AUTO: 5.83 X10^6/UL (ref 4.3–5.7)
RBC #/AREA URNS HPF: 0 /HPF (ref 0–2)
SODIUM SERPL-SCNC: 135 MMOL/L (ref 136–145)
SP GR UR STRIP: 1.02
SQUAMOUS #/AREA URNS LPF: (no result) /LPF
UROBILINOGEN UR-MCNC: 0.2 MG/DL
WBC # BLD AUTO: 10 X10^3/UL (ref 4–11)
WBC #/AREA URNS HPF: 0 /HPF (ref 0–4)

## 2019-10-04 PROCEDURE — 94640 AIRWAY INHALATION TREATMENT: CPT

## 2019-10-04 PROCEDURE — 99285 EMERGENCY DEPT VISIT HI MDM: CPT

## 2019-10-04 PROCEDURE — 80307 DRUG TEST PRSMV CHEM ANLYZR: CPT

## 2019-10-04 PROCEDURE — 74177 CT ABD & PELVIS W/CONTRAST: CPT

## 2019-10-04 PROCEDURE — 36415 COLL VENOUS BLD VENIPUNCTURE: CPT

## 2019-10-04 PROCEDURE — 85025 COMPLETE CBC W/AUTO DIFF WBC: CPT

## 2019-10-04 PROCEDURE — 96361 HYDRATE IV INFUSION ADD-ON: CPT

## 2019-10-04 PROCEDURE — 93005 ELECTROCARDIOGRAM TRACING: CPT

## 2019-10-04 PROCEDURE — 83880 ASSAY OF NATRIURETIC PEPTIDE: CPT

## 2019-10-04 PROCEDURE — 71045 X-RAY EXAM CHEST 1 VIEW: CPT

## 2019-10-04 PROCEDURE — 81001 URINALYSIS AUTO W/SCOPE: CPT

## 2019-10-04 PROCEDURE — 84484 ASSAY OF TROPONIN QUANT: CPT

## 2019-10-04 PROCEDURE — 83690 ASSAY OF LIPASE: CPT

## 2019-10-04 PROCEDURE — 80053 COMPREHEN METABOLIC PANEL: CPT

## 2019-10-04 PROCEDURE — 96374 THER/PROPH/DIAG INJ IV PUSH: CPT

## 2019-10-04 PROCEDURE — 87804 INFLUENZA ASSAY W/OPTIC: CPT

## 2019-10-04 NOTE — RAD
CT scan of the abdomen and pelvis with contrast 10/4/2019

 

CLINICAL HISTORY: Upper abdominal pain and diarrhea.

 

TECHNIQUE: After the oral and intravenous administration of contrast, 

contiguous, 5 mm axial sections were obtained through the abdomen and 

pelvis. 75 cc of Omnipaque 300 were administered intravenously during this

examination.

 

One or more of the following individualized dose reduction techniques were

utilized for this study:

 

1. Automated exposure control.

2. Adjustment of the mA and/or kV according to patient size.

3. Use of iterative reconstruction technique.

 

 

FINDINGS: Comparison study is dated 3/31/2015.

 

Images through the lung bases are within normal limits.

 

The liver parenchyma has a decreased attenuation consistent with fatty 

infiltration. The liver is mildly enlarged measuring 20 cm in length. The 

spleen is mildly enlarged measuring 17.4 cm in length. Fatty replacement 

of the pancreas is noted. The adrenal glands and kidneys are within normal

limits.

 

Atherosclerotic calcification of the abdominal aorta is seen. The 

abdominal aorta tapers normally. The gallbladder is contracted. No free 

fluid or free air is seen within the abdomen. There is no evidence of 

bowel obstruction. The appendix is well-visualized and is within normal 

limits

 

Images through the pelvis demonstrate the urinary bladder to be 

contracted. No free fluid is seen. No inflammatory changes are seen 

throughout the colon. No abnormal fluid collection is seen. Degenerative 

changes are seen involving the lower thoracic and throughout the lumbar 

spine along with both hips.

 

IMPRESSION: No acute abnormality is seen.

 

Electronically signed by: Brent Davis MD (10/4/2019 12:48 PM) [facility]

## 2019-10-04 NOTE — PHYS DOC
Past History


Past Medical History:  CAD, GERD, Hypertension, MI


Past Surgical History:  No Surgical History


Smoking:  Cigarettes


Alcohol Use:  Sober


Drug Use:  None





Adult General


Chief Complaint


Chief Complaint:  ABDOMINAL PAIN





HPI


HPI





Patient is a 49-year-old male presents with upper abdominal pain that feels like

it is sharp and piercing through him. This started approximately 3:00 this 

morning with some diarrhea, no blood in the stool. No travel or unusual foods. 

Pain is moderate to severe. Pain is radiating through to his back. No radiation 

down his legs. Nothing makes this discomfort any better or worse. Yesterday he 

was having episodes of fever and chills and soaked in a warm tub for this which 

did the symptoms. No nausea or vomiting. He has some shortness of breath that is

mild but worse than his usual baseline.[]





Review of Systems


Review of Systems





Constitutional: See history of present illness[]


Eyes: Denies change in visual acuity, redness, or eye pain []


HENT: Denies nasal congestion or sore throat []


Respiratory: Denies cough or shortness of breath []


Cardiovascular: No additional information not addressed in HPI, no palpitations,

 no worsening of his upper abdominal pain with exertion. []


GI: See history of present illness[]


: Denies dysuria or hematuria []


Musculoskeletal: Denies back pain or joint pain []


Integument: Denies rash or skin lesions []


Neurologic: Denies headache, focal weakness or sensory changes []


Endocrine: Denies polyuria or polydipsia []





All other systems were reviewed and found to be within normal limits, except as 

documented in this note.





Current Medications


Current Medications





Current Medications








 Medications


  (Trade)  Dose


 Ordered  Sig/Sylvester  Start Time


 Stop Time Status Last Admin


Dose Admin


 


 Hyoscyamine


  (Anaspaz)  0.125 mg  1X  ONCE  10/4/19 11:10


 10/4/19 11:11   





 


 Iohexol


  (Omnipaque 240


 Mg/ml)  50 ml  STK-MED ONCE  10/4/19 10:51


 10/4/19 10:51 DC  





 


 Prochlorperazine


 Edisylate


  (Compazine)  5 mg  1X  ONCE  10/4/19 11:10


 10/4/19 11:11   





 


 Sodium Chloride  1,000 ml @ 


 1,000 mls/hr  Q1H  10/4/19 10:46


 10/4/19 11:45   














Allergies


Allergies





Allergies








Coded Allergies Type Severity Reaction Last Updated Verified


 


  Penicillins Allergy Severe anaphylaxis 1/7/16 Yes











Physical Exam


Physical Exam





Constitutional: Well developed, well nourished, no acute distress, non-toxic 

appearance. []


HENT: Normocephalic, atraumatic, bilateral external ears normal, oropharynx 

moist, no oral exudates, nose normal. []


Eyes: PERRLA, EOMI, conjunctiva normal, no discharge. [] 


Neck: Normal range of motion, no tenderness, supple, no stridor. [] 


Cardiovascular:Heart rate regular rhythm, no murmur []


Lungs & Thorax:  Bilateral breath sounds with expiratory wheezes.[]


Abdomen: Bowel sounds normal, soft, tenderness in the epigastric region that re-

creates his discomfort. No rebound, no guarding, no rigidity, no caput medusa, 

no masses, no pulsatile masses. [] 


Skin: Warm, dry, no erythema, no rash. [] 


Back: No tenderness, no CVA tenderness. [] 


Extremities: No tenderness, no cyanosis, no clubbing, ROM intact, no edema. [] 


Neurologic: Alert and oriented X 3, normal motor function, normal sensory 

function, no focal deficits noted. []


Psychologic: Affect normal, judgement normal, mood normal. []





EKG


EKG


EKG shows a sinus rhythm at 86 bpm, normal axis, QTC of 405 ms, no ST elevation.

 Interpreted by me at 1055. No acute changes when compared with EKG of 8/24/201

9.[]





Radiology/Procedures


Radiology/Procedures


PROCEDURE: CT ABD PELV W/ORAL&IV CONTRAST





CT scan of the abdomen and pelvis with contrast 10/4/2019


 


CLINICAL HISTORY: Upper abdominal pain and diarrhea.


 


TECHNIQUE: After the oral and intravenous administration of contrast, 


contiguous, 5 mm axial sections were obtained through the abdomen and 


pelvis. 75 cc of Omnipaque 300 were administered intravenously during this


examination.


 


One or more of the following individualized dose reduction techniques were


utilized for this study:


 


1. Automated exposure control.


2. Adjustment of the mA and/or kV according to patient size.


3. Use of iterative reconstruction technique.


 


 


FINDINGS: Comparison study is dated 3/31/2015.


 


Images through the lung bases are within normal limits.


 


The liver parenchyma has a decreased attenuation consistent with fatty 


infiltration. The liver is mildly enlarged measuring 20 cm in length. The 


spleen is mildly enlarged measuring 17.4 cm in length. Fatty replacement 


of the pancreas is noted. The adrenal glands and kidneys are within normal


limits.


 


Atherosclerotic calcification of the abdominal aorta is seen. The 


abdominal aorta tapers normally. The gallbladder is contracted. No free 


fluid or free air is seen within the abdomen. There is no evidence of 


bowel obstruction. The appendix is well-visualized and is within normal 


limits


 


Images through the pelvis demonstrate the urinary bladder to be 


contracted. No free fluid is seen. No inflammatory changes are seen 


throughout the colon. No abnormal fluid collection is seen. Degenerative 


changes are seen involving the lower thoracic and throughout the lumbar 


spine along with both hips.


 


IMPRESSION: No acute abnormality is seen.





PROCEDURE: PORTABLE CHEST 1V





EXAM: Chest, single view.


 


HISTORY: Shortness of breath.


 


COMPARISON: 8/24/2019


 


FINDINGS: A frontal view of the chest obtained. There is no infiltrate, 


pleural effusion or pneumothorax. The heart is normal in size. There is 


suspected bilateral lateral pleural thickening due to extrapleural fat.


 


IMPRESSION: No acute pulmonary finding.


 []





Course & Med Decision Making


Course & Med Decision Making


Pertinent Labs and Imaging studies reviewed. (See chart for details)





ED course: Patient arrived, was placed in bed, and tolerated exam well. He was 

given a breathing treatment which improved his breath sounds. He was transported

 to and from radiology with any complications. After the return of the 

laboratory findings, which were delayed due to problems with the laboratory 

machine, and the imaging findings, these were discussed with the patient who 

voiced understanding. All questions were answered. He was feeling better. He was

 discharged in improved condition.





Medical decision making: There is no evidence of pancreatitis, obstruction, 

cholecystitis, appendicitis, perforation, nor acute coronary syndrome. Believe 

this to be a gastrointestinal illness along with a bronchitis type of picture. 

There is no evidence of systemic toxicity. No evidence of hypoxia.[]





Dragon Disclaimer


Dragon Disclaimer


This electronic medical record was generated, in whole or in part, using a voice

 recognition dictation system.





Departure


Departure:


Impression:  


   Primary Impression:  


   COPD with acute exacerbation


   Additional Impressions:  


   Abdominal pain


   Diarrhea


Disposition:  01 HOME, SELF-CARE


Condition:  IMPROVED


Referrals:  


RADHA FLOWERS (PCP)


Follow-up in 2 days


Patient Instructions:  Abdominal Pain, Chronic Obstructive Pulmonary Disease 

Exacerbation, Diarrhea, Diet for Diarrhea, Adult





Additional Instructions:  


Drink plenty of fluids, frequent small sips. No fatty foods, no milk, and no 

pepper for the next 48 hours. For the next 48 hours eat a diet rich in 

carbohydrates with foods such as bananas, rice, applesauce, and toast. Stop 

smoking! Follow-up with your regular doctor in 2 days. Return to the ER if 

worsening pain, unable to tolerate liquids, or any other concerns.


Scripts


Prednisone (PREDNISONE) 50 Mg Tablet


1 TAB PO DAILY for INFLAMMATION, #5 TAB


   Prov: REE DIANA DO         10/4/19 


Meloxicam (MELOXICAM) 7.5 Mg Tablet


7.5 MG PO DAILY for PAIN, #20 TAB


   Prov: REE DIANA DO         10/4/19 


Hyoscyamine Sulfate (LEVSIN) 0.125 Mg Tablet


0.125 MG PO QID for abdominal pain/cramping, #30 TAB


   Prov: REE DIANA DO         10/4/19





Problem Qualifiers








   Additional Impressions:  


   Abdominal pain


   Abdominal location:  epigastric  Qualified Codes:  R10.13 - Epigastric pain


   Diarrhea


   Diarrhea type:  unspecified type  Qualified Codes:  R19.7 - Diarrhea, 

   unspecified








REE DIANA DO           Oct 4, 2019 11:01

## 2019-10-04 NOTE — EKG
[facility] [address], [place] [address]

Test Date:    2019-10-04               Test Time:    10:53:57

Pat Name:     ADELFO REDMAN              Department:   

Patient ID:   [id number]           Room:          

Gender:       M                        Technician:   JUANIS

:          1970               Requested By: REE DIANA

Order Number: [id number]            Reading MD:   Octaviano Aponte MD

                                 Measurements

Intervals                              Axis          

Rate:         86                       P:            52

NM:           126                      QRS:          55

QRSD:         94                       T:            18

QT:           336                                    

QTc:          405                                    

                           Interpretive Statements

SINUS RHYTHM

NON-SPECIFIC ST/T CHANGES

Electronically Signed On 10- 9:34:57 CDT by Octaviano Aponte MD

## 2019-10-04 NOTE — RAD
EXAM: Chest, single view.

 

HISTORY: Shortness of breath.

 

COMPARISON: 8/24/2019

 

FINDINGS: A frontal view of the chest obtained. There is no infiltrate, 

pleural effusion or pneumothorax. The heart is normal in size. There is 

suspected bilateral lateral pleural thickening due to extrapleural fat.

 

IMPRESSION: No acute pulmonary finding.

 

Electronically signed by: Ebony Bear MD (10/4/2019 12:42 PM) [facility]

## 2019-10-31 ENCOUNTER — HOSPITAL ENCOUNTER (OUTPATIENT)
Dept: HOSPITAL 63 - SURG | Age: 49
End: 2019-10-31
Attending: ANESTHESIOLOGY
Payer: MEDICARE

## 2019-10-31 VITALS — SYSTOLIC BLOOD PRESSURE: 161 MMHG | DIASTOLIC BLOOD PRESSURE: 89 MMHG

## 2019-10-31 DIAGNOSIS — Z87.891: ICD-10-CM

## 2019-10-31 DIAGNOSIS — M54.16: Primary | ICD-10-CM

## 2019-10-31 DIAGNOSIS — I25.10: ICD-10-CM

## 2019-10-31 DIAGNOSIS — J44.9: ICD-10-CM

## 2019-10-31 PROCEDURE — 62323 NJX INTERLAMINAR LMBR/SAC: CPT

## 2019-12-04 ENCOUNTER — HOSPITAL ENCOUNTER (OUTPATIENT)
Dept: HOSPITAL 63 - SURG | Age: 49
Discharge: HOME | End: 2019-12-04
Attending: ANESTHESIOLOGY
Payer: MEDICARE

## 2019-12-04 VITALS — SYSTOLIC BLOOD PRESSURE: 136 MMHG | DIASTOLIC BLOOD PRESSURE: 87 MMHG

## 2019-12-04 DIAGNOSIS — G89.4: ICD-10-CM

## 2019-12-04 DIAGNOSIS — I10: ICD-10-CM

## 2019-12-04 DIAGNOSIS — F17.200: ICD-10-CM

## 2019-12-04 DIAGNOSIS — F11.90: ICD-10-CM

## 2019-12-04 DIAGNOSIS — M19.90: ICD-10-CM

## 2019-12-04 DIAGNOSIS — I25.10: ICD-10-CM

## 2019-12-04 DIAGNOSIS — M54.16: Primary | ICD-10-CM

## 2019-12-04 DIAGNOSIS — J44.9: ICD-10-CM

## 2019-12-04 DIAGNOSIS — M54.5: ICD-10-CM

## 2019-12-04 DIAGNOSIS — E66.9: ICD-10-CM

## 2019-12-04 DIAGNOSIS — Z79.899: ICD-10-CM

## 2019-12-04 PROCEDURE — 99214 OFFICE O/P EST MOD 30 MIN: CPT

## 2020-01-30 ENCOUNTER — HOSPITAL ENCOUNTER (OUTPATIENT)
Dept: HOSPITAL 63 - SURG | Age: 50
Discharge: HOME | End: 2020-01-30
Attending: ANESTHESIOLOGY
Payer: MEDICARE

## 2020-01-30 VITALS
DIASTOLIC BLOOD PRESSURE: 88 MMHG | SYSTOLIC BLOOD PRESSURE: 147 MMHG | DIASTOLIC BLOOD PRESSURE: 88 MMHG | SYSTOLIC BLOOD PRESSURE: 147 MMHG

## 2020-01-30 DIAGNOSIS — I25.10: ICD-10-CM

## 2020-01-30 DIAGNOSIS — F11.90: ICD-10-CM

## 2020-01-30 DIAGNOSIS — K44.9: ICD-10-CM

## 2020-01-30 DIAGNOSIS — J44.9: ICD-10-CM

## 2020-01-30 DIAGNOSIS — Z79.82: ICD-10-CM

## 2020-01-30 DIAGNOSIS — F17.210: ICD-10-CM

## 2020-01-30 DIAGNOSIS — G89.4: ICD-10-CM

## 2020-01-30 DIAGNOSIS — M54.16: Primary | ICD-10-CM

## 2020-01-30 DIAGNOSIS — I10: ICD-10-CM

## 2020-01-30 DIAGNOSIS — Z87.39: ICD-10-CM

## 2020-01-30 PROCEDURE — 62323 NJX INTERLAMINAR LMBR/SAC: CPT

## 2020-02-10 ENCOUNTER — HOSPITAL ENCOUNTER (OUTPATIENT)
Dept: HOSPITAL 63 - US | Age: 50
Discharge: HOME | End: 2020-02-10
Payer: MEDICARE

## 2020-02-10 DIAGNOSIS — D17.1: Primary | ICD-10-CM

## 2020-02-10 PROCEDURE — 76604 US EXAM CHEST: CPT

## 2020-02-10 NOTE — RAD
Sonography of the chest/abdomen

 

Clinical indications: Palpable lump of the upper abdominal wall and lower 

chest on the right side.

 

FINDINGS: High-resolution sonography of the palpable lump was performed. 

In this area, there is subcutaneous hyperechoic elliptical solid mass 

without abnormal internal color Doppler flow. This mass measures 3.4 cm 

longitudinally and 1.3 cm in AP dimension and 5.2 cm transversely. This is

consistent with a lipoma.

 

IMPRESSION: Palpable lump corresponds to a solid hyperechoic mass 

consistent with a lipoma. The lipomatous nature of this mass may be 

confirmed with a limited CT study through this area if clinically needed. 

Otherwise clinical follow-up is needed with regard to any growth in size 

since a sarcoma cannot be excluded.

 

Electronically signed by: Audie Oliva MD (2/10/2020 3:58 PM) [facility]

## 2020-02-19 ENCOUNTER — HOSPITAL ENCOUNTER (OUTPATIENT)
Dept: HOSPITAL 61 - SLPLAB | Age: 50
Discharge: HOME | End: 2020-02-19
Attending: INTERNAL MEDICINE
Payer: MEDICARE

## 2020-02-19 DIAGNOSIS — G47.33: Primary | ICD-10-CM

## 2020-02-19 DIAGNOSIS — G47.34: ICD-10-CM

## 2020-02-19 PROCEDURE — 95810 POLYSOM 6/> YRS 4/> PARAM: CPT

## 2020-02-20 NOTE — SLEEP
DATE OF STUDY:  02/19/2020



REFERRING PHYSICIAN:  Steffen Berman MD



PRIMARY CARE PHYSICIAN:  KIRAN Alegre



The patient is a 50-year-old who weighs 360 pounds with a BMI of 48.  The

patient underwent diagnostic sleep study performed at [facility].



During the night study, the patient spent 179 minutes in bed and slept for 88

minutes with a low sleep efficiency of 49%.  Sleep latency was 40 minutes with

an absent REM sleep.  Sleep architecture showed increased stage 1 and stage 2

sleep, normal slow wave and absent REM sleep.



During the night study, the patient had 3 obstructive apneas, no mixed or

central apneas and 36 hypopneas.  The patient's AHI was 27 per hour.  No supine

or REM sleep was observed.



EKG monitoring revealed an average heart rate of 90 beats per minute.  It was

consistent with LVH.



No clinically significant PLM seen.



Nocturnal oximetry study revealed a mean oxygen saturation of 92% with the

lowest of 85%.  A 66% of time oxygen saturation remained between 80% and 89%.



The patient met the criteria for CPAP, but the patient refused CPAP or BiPAP

devices.



IMPRESSION:

1.  Moderate sleep apnea-hypopnea syndrome at an AHI of 27 per hour.  Absence of

REM sleep can underestimate the severity of sleep apnea.

2.  Nocturnal hypoxia secondary to combination of sleep apnea and suspected

hypoventilation.

3.  No clinically significant periodic limb movements.



RECOMMENDATIONS:

1.  The patient met the criteria for CPAP initiation; however, the patient

refused any form of CPAP or BiPAP.  The patient can be desensitized with the use

of positive pressure therapy and if willing to try again, another titration

study can be rescheduled.

2.  Weight loss is strongly advised.

3.  Avoid CNS depressants.

4.  Cautioned regarding driving until symptoms of sleep apnea resolve with above

recommendations.

5.  If the patient refuses to use CPAP or BiPAP, then the patient would be

eligible for nocturnal oxygen.

 



______________________________

HOLLIE MONTANA MD DR:  SUNI/calvin  JOB#:  [id number] / [id number]

DD:  02/20/2020 14:58  DT:  02/20/2020 15:55

## 2020-03-05 ENCOUNTER — HOSPITAL ENCOUNTER (OUTPATIENT)
Dept: HOSPITAL 63 - SURG | Age: 50
End: 2020-03-05
Attending: ANESTHESIOLOGY
Payer: MEDICARE

## 2020-03-05 VITALS
SYSTOLIC BLOOD PRESSURE: 174 MMHG | SYSTOLIC BLOOD PRESSURE: 174 MMHG | DIASTOLIC BLOOD PRESSURE: 97 MMHG | DIASTOLIC BLOOD PRESSURE: 97 MMHG | DIASTOLIC BLOOD PRESSURE: 97 MMHG | SYSTOLIC BLOOD PRESSURE: 174 MMHG

## 2020-03-05 DIAGNOSIS — G89.4: ICD-10-CM

## 2020-03-05 DIAGNOSIS — F11.90: ICD-10-CM

## 2020-03-05 DIAGNOSIS — M54.5: ICD-10-CM

## 2020-03-05 DIAGNOSIS — M54.16: Primary | ICD-10-CM

## 2020-03-05 DIAGNOSIS — M54.9: ICD-10-CM

## 2020-03-05 DIAGNOSIS — E66.9: ICD-10-CM

## 2020-03-05 PROCEDURE — 99214 OFFICE O/P EST MOD 30 MIN: CPT

## 2020-03-05 PROCEDURE — G0463 HOSPITAL OUTPT CLINIC VISIT: HCPCS

## 2020-07-09 ENCOUNTER — HOSPITAL ENCOUNTER (OUTPATIENT)
Dept: HOSPITAL 63 - DXRAD | Age: 50
End: 2020-07-09
Attending: PSYCHIATRY & NEUROLOGY
Payer: MEDICARE

## 2020-07-09 DIAGNOSIS — M50.122: Primary | ICD-10-CM

## 2020-07-09 PROCEDURE — 72040 X-RAY EXAM NECK SPINE 2-3 VW: CPT

## 2020-07-09 NOTE — RAD
EXAM: CERVICAL SPINE 2-3V.

 

HISTORY: Neck pain.

 

COMPARISON: None.

 

FINDINGS: Mild straightening of the normal cervical lordosis is likely 

positional. There is no prevertebral soft tissue swelling. No fractures 

are identified. Degenerative disc disease is moderate at C6-7 and mild at 

C5-6.

 

IMPRESSION: 

1. Degenerative disc disease is mild at C5-6 and moderate at C6-7.

 

Electronically signed by: LANA Martini MD (7/9/2020 3:43 PM) [id number]

## 2020-07-21 ENCOUNTER — HOSPITAL ENCOUNTER (OUTPATIENT)
Dept: HOSPITAL 63 - ECHO | Age: 50
Discharge: HOME | End: 2020-07-21
Payer: MEDICARE

## 2020-07-21 DIAGNOSIS — I25.119: Primary | ICD-10-CM

## 2020-07-21 PROCEDURE — 93306 TTE W/DOPPLER COMPLETE: CPT

## 2020-07-21 NOTE — CARD
MR#: [id number]

Account#: [id number]

Accession#: [id number]

Date of Study: 07/21/2020

Ordering Physician: SHAHBAZ HAND, 

Referring Physician: SHAHBAZ HAND 

Tech: Gissel Gloria RDCS





--------------- APPROVED REPORT --------------





EXAM: Two-dimensional and M-mode echocardiogram with Doppler and color Doppler.



Other Information 

Quality : Fair



INDICATION

Cardiac Disease: CAD 



RISK FACTORS

Obesity   

Smoking 



2D DIMENSIONS 

RVDd2.4 (2.9-3.5cm)Left Atrium(2D)3.4 (1.6-4.0cm)

IVSd1.1 (0.7-1.1cm)Aortic Root(2D)3.4 (2.0-3.7cm)

LVDd5.6 (3.9-5.9cm)LVOT Diameter2.2 (1.8-2.4cm)

PWd1.1 (0.7-1.1cm)LVDs4.0 (2.5-4.0cm)

FS (%) 27.4 %SV80.1 ml

LVEF(%)52.7 (>50%)



Aortic Valve

AoV Peak Demetrio.122.9cm/sAoV VTI20.6cm

AO Peak GR.6.0mmHgLVOT Peak Demetrio.137.3cm/s

LVOT  VTI 22.45cmAO Mean GR.4mmHg

FAUZIA (VMAX)4.80fs8ECL   (VTI)4.29cm2



Mitral Valve

MV E Fyzhvivn42.9cm/sMV DECEL RNCX420ds

MV A Hvsboera01.4cm/sE/A  Ratio1.0



Tricuspid Valve

TR P. Ejgftbhv244ll/sRAP UKBIILGI3elOq

TR Peak Gr.70gpKdJYAO27arOi



Pulmonary Vein

S1 Qatkmczk90.8cm/sD2 Phiatklb66.5cm/s



 LEFT VENTRICLE 

The left ventricle is normal size. There is normal left ventricular wall thickness. Left ventricle sy
stolic function is normal. The Ejection Fraction is 50-55%. There is normal LV segmental wall motion.
 The left ventricular diastolic function and filling is normal for age.



 RIGHT VENTRICLE 

The right ventricle is normal size. The right ventricular systolic function is normal.



 ATRIA 

The left atrium size is normal. The right atrium size is normal. The interatrial septum is intact wit
h no evidence for an atrial septal defect or patent foramen ovale as noted on 2-D or Doppler imaging.




 AORTIC VALVE 

The aortic valve is not well visualized. Doppler and Color Flow revealed no significant aortic regurg
itation. There is no significant aortic valvular stenosis.



 MITRAL VALVE 

The mitral valve is normal in structure and function. There is no evidence of mitral valve prolapse. 
There is no mitral valve stenosis. Doppler and Color Flow revealed no mitral valve regurgitation note
d.



 TRICUSPID VALVE 

The tricuspid valve is normal in structure and function. Doppler and Color Flow revealed trace tricus
pid regurgitation. The PA pressure was estimated at 21 mmHg. There is no tricuspid valve stenosis.



 PULMONIC VALVE 

The pulmonic valve is not well visualized. Doppler and Color Flow revealed no pulmonic valvular regur
gitation. There is no pulmonic valvular stenosis.



 GREAT VESSELS 

The aortic root is normal in size. The ascending aorta is not well seen. The IVC was not visualized.



 PERICARDIAL EFFUSION 

There is no evidence of significant pericardial effusion.



Critical Notification

Critical Value: No



<Conclusion>

The left ventricle is normal size.

Left ventricle systolic function is normal.

The Ejection Fraction is 50-55%.

There is normal LV segmental wall motion.

Doppler and Color Flow revealed no significant aortic regurgitation.

There is no significant aortic valvular stenosis.

Doppler and Color Flow revealed no mitral valve regurgitation noted.

Doppler and Color Flow revealed trace tricuspid regurgitation.

The PA pressure was estimated at 21 mmHg.



Signed by : Rafy Dial MD

Electronically Approved : 07/21/2020 15:52:38

## 2020-08-26 ENCOUNTER — HOSPITAL ENCOUNTER (OUTPATIENT)
Dept: HOSPITAL 63 - SURG | Age: 50
Discharge: HOME | End: 2020-08-26
Attending: ANESTHESIOLOGY
Payer: MEDICARE

## 2020-08-26 VITALS — DIASTOLIC BLOOD PRESSURE: 86 MMHG | SYSTOLIC BLOOD PRESSURE: 143 MMHG

## 2020-08-26 DIAGNOSIS — Z79.899: ICD-10-CM

## 2020-08-26 DIAGNOSIS — M54.16: Primary | ICD-10-CM

## 2020-08-26 DIAGNOSIS — G47.30: ICD-10-CM

## 2020-08-26 DIAGNOSIS — J44.9: ICD-10-CM

## 2020-08-26 DIAGNOSIS — I10: ICD-10-CM

## 2020-08-26 DIAGNOSIS — M19.90: ICD-10-CM

## 2020-08-26 PROCEDURE — 64483 NJX AA&/STRD TFRM EPI L/S 1: CPT

## 2020-08-26 PROCEDURE — 64484 NJX AA&/STRD TFRM EPI L/S EA: CPT

## 2020-09-28 ENCOUNTER — HOSPITAL ENCOUNTER (EMERGENCY)
Dept: HOSPITAL 63 - ER | Age: 50
Discharge: LEFT BEFORE DISCHARGE | End: 2020-09-28
Payer: MEDICARE

## 2020-09-28 VITALS — HEIGHT: 73 IN | BODY MASS INDEX: 41.75 KG/M2 | WEIGHT: 315 LBS

## 2020-09-28 VITALS — DIASTOLIC BLOOD PRESSURE: 84 MMHG | SYSTOLIC BLOOD PRESSURE: 145 MMHG

## 2020-09-28 DIAGNOSIS — Z86.73: ICD-10-CM

## 2020-09-28 DIAGNOSIS — I10: ICD-10-CM

## 2020-09-28 DIAGNOSIS — M54.5: Primary | ICD-10-CM

## 2020-09-28 DIAGNOSIS — I25.10: ICD-10-CM

## 2020-09-28 DIAGNOSIS — J44.9: ICD-10-CM

## 2020-09-28 DIAGNOSIS — F17.210: ICD-10-CM

## 2020-09-28 DIAGNOSIS — E78.00: ICD-10-CM

## 2020-09-28 DIAGNOSIS — Z53.21: ICD-10-CM

## 2020-09-28 DIAGNOSIS — E11.9: ICD-10-CM

## 2020-09-28 NOTE — PHYS DOC
Past History


Past Medical History:  CAD, COPD, Diabetes, High Cholesterol, Hypertension, TIA


Past Surgical History:  Other


Smoking:  Cigarettes


Alcohol Use:  None


Drug Use:  None





Departure


Departure:


Impression:  


   Primary Impression:  


   Patient left without being seen


Disposition:  01 LEFT WITHOUT BEING SEEN


Condition:  STABLE





PROVIDER NOTE


I signed myself to this patient to be seen, however the patient did arrive 

during a high volume of patient arrivals.  I was delayed in being able to 

evaluate this patient in the emergency department.  Before I was able to examine

the patient, I was informed that the patient decided to leave without being 

seen.  No medical evaluation was administered nor were any treatments given to 

this patient.











ONEL ONEILL MD               Sep 28, 2020 23:23

## 2020-11-11 ENCOUNTER — HOSPITAL ENCOUNTER (OUTPATIENT)
Dept: HOSPITAL 63 - SURG | Age: 50
Discharge: HOME | End: 2020-11-11
Attending: ANESTHESIOLOGY
Payer: MEDICARE

## 2020-11-11 VITALS
SYSTOLIC BLOOD PRESSURE: 170 MMHG | SYSTOLIC BLOOD PRESSURE: 170 MMHG | DIASTOLIC BLOOD PRESSURE: 62 MMHG | DIASTOLIC BLOOD PRESSURE: 62 MMHG

## 2020-11-11 DIAGNOSIS — I10: ICD-10-CM

## 2020-11-11 DIAGNOSIS — K21.9: ICD-10-CM

## 2020-11-11 DIAGNOSIS — F11.90: ICD-10-CM

## 2020-11-11 DIAGNOSIS — Z88.0: ICD-10-CM

## 2020-11-11 DIAGNOSIS — G47.30: ICD-10-CM

## 2020-11-11 DIAGNOSIS — E03.0: ICD-10-CM

## 2020-11-11 DIAGNOSIS — F17.210: ICD-10-CM

## 2020-11-11 DIAGNOSIS — E66.8: ICD-10-CM

## 2020-11-11 DIAGNOSIS — Z79.899: ICD-10-CM

## 2020-11-11 DIAGNOSIS — Z87.39: ICD-10-CM

## 2020-11-11 DIAGNOSIS — I25.2: ICD-10-CM

## 2020-11-11 DIAGNOSIS — Z86.73: ICD-10-CM

## 2020-11-11 DIAGNOSIS — Z79.01: ICD-10-CM

## 2020-11-11 DIAGNOSIS — M54.16: Primary | ICD-10-CM

## 2020-11-11 DIAGNOSIS — Z79.82: ICD-10-CM

## 2020-11-11 DIAGNOSIS — I25.118: ICD-10-CM

## 2020-11-11 DIAGNOSIS — E11.9: ICD-10-CM

## 2020-11-11 DIAGNOSIS — J44.9: ICD-10-CM

## 2020-11-11 DIAGNOSIS — G43.909: ICD-10-CM

## 2020-11-11 DIAGNOSIS — Z98.890: ICD-10-CM

## 2020-11-11 DIAGNOSIS — E78.5: ICD-10-CM

## 2020-11-11 PROCEDURE — 62323 NJX INTERLAMINAR LMBR/SAC: CPT

## 2020-11-11 PROCEDURE — 72275: CPT

## 2020-11-20 ENCOUNTER — HOSPITAL ENCOUNTER (OUTPATIENT)
Dept: HOSPITAL 63 - DXRAD | Age: 50
End: 2020-11-20
Attending: PHYSICIAN ASSISTANT
Payer: MEDICARE

## 2020-11-20 DIAGNOSIS — R05: Primary | ICD-10-CM

## 2020-11-20 DIAGNOSIS — M47.814: ICD-10-CM

## 2020-11-20 PROCEDURE — 71046 X-RAY EXAM CHEST 2 VIEWS: CPT

## 2020-11-20 NOTE — RAD
PA and lateral chest radiographs 11/20/2020

 

CLINICAL HISTORY: Chronic cough.

 

TECHNIQUE: Two PA and a lateral digital radiographs of the chest were 

obtained. Comparison study is dated 10/4/2019.

 

The cardiac and mediastinal silhouettes are within normal limits in size 

and configuration. No acute pulmonary infiltrate is seen. No pleural 

effusion or pneumothorax is noted. Degenerative changes are seen involving

the thoracic spine.

 

IMPRESSION: No acute abnormality is seen.

 

Electronically signed by: Brent Davis MD (11/20/2020 9:03 AM) [id number]

## 2020-11-21 ENCOUNTER — HOSPITAL ENCOUNTER (EMERGENCY)
Dept: HOSPITAL 63 - ER | Age: 50
Discharge: HOME | End: 2020-11-21
Payer: MEDICARE

## 2020-11-21 VITALS
SYSTOLIC BLOOD PRESSURE: 124 MMHG | DIASTOLIC BLOOD PRESSURE: 78 MMHG | SYSTOLIC BLOOD PRESSURE: 124 MMHG | DIASTOLIC BLOOD PRESSURE: 78 MMHG | DIASTOLIC BLOOD PRESSURE: 78 MMHG | BODY MASS INDEX: 41.75 KG/M2 | WEIGHT: 315 LBS | HEIGHT: 73 IN | DIASTOLIC BLOOD PRESSURE: 78 MMHG | SYSTOLIC BLOOD PRESSURE: 124 MMHG | SYSTOLIC BLOOD PRESSURE: 124 MMHG

## 2020-11-21 DIAGNOSIS — S90.31XA: ICD-10-CM

## 2020-11-21 DIAGNOSIS — I11.9: ICD-10-CM

## 2020-11-21 DIAGNOSIS — S90.32XA: ICD-10-CM
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
## 2020-11-21 DIAGNOSIS — I10: ICD-10-CM

## 2020-11-21 DIAGNOSIS — Z86.73: ICD-10-CM
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
## 2020-11-21 DIAGNOSIS — S93.491A: Primary | ICD-10-CM

## 2020-11-21 DIAGNOSIS — Z88.0: ICD-10-CM
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
## 2020-11-21 DIAGNOSIS — Z98.890: ICD-10-CM

## 2020-11-21 DIAGNOSIS — J44.9: ICD-10-CM

## 2020-11-21 DIAGNOSIS — E78.00: ICD-10-CM

## 2020-11-21 DIAGNOSIS — F17.210: ICD-10-CM
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
## 2020-11-21 PROCEDURE — 99284 EMERGENCY DEPT VISIT MOD MDM: CPT

## 2020-11-21 PROCEDURE — 72170 X-RAY EXAM OF PELVIS: CPT

## 2020-11-21 PROCEDURE — 73630 X-RAY EXAM OF FOOT: CPT

## 2020-11-21 PROCEDURE — 73610 X-RAY EXAM OF ANKLE: CPT

## 2020-11-21 NOTE — PHYS DOC
Past History


Past Medical History:  CAD, COPD, Diabetes, High Cholesterol, Hypertension, TIA


Past Surgical History:  Other


Smoking:  Cigarettes


Alcohol Use:  None


Drug Use:  None





General Adult


EDM:


Chief Complaint:  MECHANICAL FALL





HPI:


HPI:





Patient is a 50-year-old male who was seen here for evaluation bilateral foot 

and ankle pain after he jumped off a six feet ladder,landed on his feet on 

concrete.  Patient said he was on the ladder and it was not steady, instead of 

falling down, he jumped off. When he was on the ground, he fell on his buttock. 

He denied any back pain, no upper extremity pain, no knee pain,   NO hip pain.  

No head or neck pain.





Review of Systems:


Review of Systems:


Constitutional:  Denies fever or chills 


Eyes:  Denies change in visual acuity 


HENT:  Denies nasal congestion or sore throat 


Respiratory:  Denies cough or shortness of breath 


Cardiovascular:  Denies chest pain or edema 


GI:  Denies abdominal pain, nausea, vomiting, bloody stools or diarrhea 


: Denies dysuria 


Musculoskeletal:  Positive for ankle pain, feet pain.  


Integument:  Denies rash 


Neurologic:  Denies headache, focal weakness or sensory changes 


Endocrine:  Denies polyuria or polydipsia 


Lymphatic:  Denies swollen glands 


Psychiatric:  Denies depression or anxiety





Allergies:


Allergies:





Allergies








Coded Allergies Type Severity Reaction Last Updated Verified


 


  Penicillins Allergy Severe anaphylaxis 20 Yes











Physical Exam:


PE:





Constitutional: Well developed, well nourished, no acute distress, non-toxic 

appearance. []


HENT: Normocephalic, atraumatic, bilateral external ears normal, oropharynx 

moist, no oral exudates, nose normal. []


Eyes: PERRLA, EOMI, conjunctiva normal, no discharge. [] 


Neck: Normal range of motion, no tenderness, supple, no stridor. [] 


Cardiovascular:Heart rate regular rhythm, no murmur []


Lungs & Thorax:  Bilateral breath sounds clear to auscultation []


Abdomen: Bowel sounds normal, soft, no tenderness, no masses, no pulsatile 

masses. [] 


Skin: Warm, dry, no erythema, no rash. [] 


Back: No tenderness, no CVA tenderness. [] 


Extremities: There is no swelling or tenderness to palpation on bilateral knee, 

no swelling or tenderness to palpation on bilateral calcaneus.  Bilateral ankle 

joints are stable, there is some tenderness to palpation on the lateral 

malleolar area of the right ankle.  There is no swelling.  There is no swelling 

or tenderness to palpation on both feet.  Pelvic is stable, no tenderness to 

palpation.


Neurologic: Alert and oriented X 3, normal motor function, normal sensory 

function, no focal deficits noted. []


Psychologic: Affect normal, judgement normal, mood normal. []





EKG:


EKG:


[]





Radiology/Procedures:


Radiology/Procedures:


[][facility] [address], [place] [address]


                                 [phone]


                                        


                                 IMAGING REPORT





                                     Signed





PATIENT: ADELFO REDMAN    ACCOUNT: [id number]     MRN#: [id number]


: 1970           LOCATION: ER              AGE: 50


SEX: M                    EXAM DT: 20         ACCESSION#: [id number]


STATUS: REG ER            ORD. PHYSICIAN: JOVANA ROCA DO


REASON: FELL OFF LADDER, LANDED ON BUTTOCK


PROCEDURE: PELVIS





 


INDICATION: Status post fall


 


COMPARISON: None.


 


IMPRESSION: 


 


Pelvis: 2 views obtained. Degenerative changes the hips. No evidence of 


dislocation. A definite acute fracture line is not seen.


 


Bilateral ankle: 3 views of each ankle obtained.


Soft tissue swelling at the left ankle without a definite acute fracture 


or dislocation. There is some degenerative changes seen.


There is soft tissue swelling at the right ankle is some degenerative 


changes. No evidence of acute fracture or dislocation.


 


Bilateral feet: 3 views of each foot obtained.


Edema is seen within the soft tissues of the right foot. Erosions are seen


at the first metatarsal head with hypertrophic changes. Portion this could


be from cyst formation as well. A definite acute fracture line is not 


seen. There is some overlap limiting midfoot.


Hypertrophic changes at the left first metatarsophalangeal joint. There is


some overlap limiting midfoot but a definite acute fracture line is not 


seen.


 


Electronically signed by: Louise Dutton MD (2020 5:16 PM) 


[id number]














DICTATED AND SIGNED BY:     LOUISE DUTTON MD


DATE:     20 1716





CC: RADHA FLOWERS; JOVANA ROCA DO ~MTH0 0





Heart Score:


Risk Factors:


Risk Factors:  DM, Current or recent (<one month) smoker, HTN, HLP, family 

history of CAD, obesity.


Risk Scores:


Score 0 - 3:  2.5% MACE over next 6 weeks - Discharge Home


Score 4 - 6:  20.3% MACE over next 6 weeks - Admit for Clinical Observation


Score 7 - 10:  72.7% MACE over next 6 weeks - Early Invasive Strategies





Course & Med Decision Making:


Course & Med Decision Making


Pertinent Labs and Imaging studies reviewed. (See chart for details)





Patient is a 50-year-old male who jumped off a ladder, came in with bilateral 

feet and ankle pain, x-ray did not show any acute fracture or dislocation.  

Patient was discharged home, he was walking out of  here without any problem





Dragon Disclaimer:


Dragon Disclaimer:


This electronic medical record was generated, in whole or in part, using a voice

 recognition dictation system.





Departure


Departure:


Impression:  


   Primary Impression:  


   Right ankle sprain


   Additional Impressions:  


   Contusion of foot, left


   Contusion of foot, right


Disposition:  01 DC HOME SELF CARE/HOMELESS


Condition:  STABLE


Referrals:  


RADHA FLOWERS (PCP)


please follow up with your doctor next week for reevaluation


Patient Instructions:  Ankle Sprain, Contusion


Scripts


Naproxen Sodium (ANAPROX DS) 550 Mg Tablet


1 TAB PO BID for pain for 15 Days, #30 TAB 0 Refills


   Prov: JOVANA ROCA DO         20











JOVANA ROCA DO                2020 16:23

## 2020-11-21 NOTE — RAD
INDICATION: Status post fall

 

COMPARISON: None.

 

IMPRESSION: 

 

Pelvis: 2 views obtained. Degenerative changes the hips. No evidence of 

dislocation. A definite acute fracture line is not seen.

 

Bilateral ankle: 3 views of each ankle obtained.

Soft tissue swelling at the left ankle without a definite acute fracture 

or dislocation. There is some degenerative changes seen.

There is soft tissue swelling at the right ankle is some degenerative 

changes. No evidence of acute fracture or dislocation.

 

Bilateral feet: 3 views of each foot obtained.

Edema is seen within the soft tissues of the right foot. Erosions are seen

at the first metatarsal head with hypertrophic changes. Portion this could

be from cyst formation as well. A definite acute fracture line is not 

seen. There is some overlap limiting midfoot.

Hypertrophic changes at the left first metatarsophalangeal joint. There is

some overlap limiting midfoot but a definite acute fracture line is not 

seen.

 

Electronically signed by: Thai Monae MD (11/21/2020 5:16 PM) 

[id number]

## 2020-11-21 NOTE — RAD
INDICATION: Status post fall

 

COMPARISON: None.

 

IMPRESSION: 

 

Pelvis: 2 views obtained. Degenerative changes the hips. No evidence of 

dislocation. A definite acute fracture line is not seen.

 

Bilateral ankle: 3 views of each ankle obtained.

Soft tissue swelling at the left ankle without a definite acute fracture 

or dislocation. There is some degenerative changes seen.

There is soft tissue swelling at the right ankle is some degenerative 

changes. No evidence of acute fracture or dislocation.

 

Bilateral feet: 3 views of each foot obtained.

Edema is seen within the soft tissues of the right foot. Erosions are seen

at the first metatarsal head with hypertrophic changes. Portion this could

be from cyst formation as well. A definite acute fracture line is not 

seen. There is some overlap limiting midfoot.

Hypertrophic changes at the left first metatarsophalangeal joint. There is

some overlap limiting midfoot but a definite acute fracture line is not 

seen.

 

Electronically signed by: Thai Monae MD (11/21/2020 5:16 PM) 

[id number]

## 2020-12-10 ENCOUNTER — HOSPITAL ENCOUNTER (OUTPATIENT)
Dept: HOSPITAL 63 - CT | Age: 50
End: 2020-12-10
Payer: MEDICARE

## 2020-12-10 DIAGNOSIS — K43.9: ICD-10-CM

## 2020-12-10 DIAGNOSIS — K76.0: Primary | ICD-10-CM

## 2020-12-10 DIAGNOSIS — R16.1: ICD-10-CM

## 2020-12-10 PROCEDURE — 74177 CT ABD & PELVIS W/CONTRAST: CPT

## 2020-12-10 NOTE — RAD
Study: CT abdomen/pelvis with intravenous contrast



Indication: Recent onset right upper quadrant abdominal pain.



Comparison: Most recently on 10/4/2019



Technique: Helical CT imaging performed of the abdomen and pelvis after the intravenous administratio
n of 74 cc Omnipaque 300 contrast. Sagittal and coronal reformats were obtained. 



One or more of the following individualized dose reduction techniques were utilized for this examinat
ion:  



1. Automated exposure control

2. Adjustment of the mA and/or kV according to patient size

3. Use of iterative reconstruction technique.



Findings:



Chest: No significant interval change.



Liver: Diffuse hepatic steatosis.



Gallbladder/Biliary Tree: No CT findings that would suggest acute cholecystitis. Unremarkable biliary
 tree.



Pancreas: Similar degree of fatty infiltration. No peripancreatic inflammatory changes though suggest
 pancreatitis.



Spleen: Unchanged degree of splenomegaly measuring 18.4 cm AP.



Adrenal Glands: No adrenal gland mass.



Kidneys/Ureters/Bladder: Scattered cystic foci bilaterally. A low-attenuation focus at the posterolat
eral aspect of the lower left kidney, image 26 series 3, is better visualized on this exam but was fa
intly present previously. It is favored that improved visualization today relates to bolus timing. Th
e degree of perinephric fatty stranding has not significant changed. No collecting system dilatation.
 The urinary bladder is within normal limits.



Reproductive Organs: Unchanged size of the prostate. 



Colon: A portion of the descending and distal transverse colon are excluded from the field-of-view. T
aking this into consideration the colon is unremarkable. Mild volume well-formed stool burden.



Appendix: Normal.



Small Bowel: Nonobstructed.



Stomach: Unremarkable.



Vasculature: Scattered aortic and iliofemoral calcific atherosclerosis. Nonaneurysmal aorta. Patent c
entral portal veins and superior mesenteric vein.



Lymph Nodes: A few mildly prominent but morphologically benign inguinal lymph nodes on both sides. No
 concerning lymph nodes throughout the abdomen or pelvis.



Peritoneum and Body Wall: Redemonstrated right larger than left fat-containing inguinal hernias. Ther
e is again a small supraumbilical hernia, image 38 series 3, though slightly increased in size. It no
w appears as if a tiny portion of the anterior wall of the subjacent small bowel partially herniates 
through the defect, images 50 and 51 series 5. No free fluid or pneumoperitoneum.



Bones: No acute or aggressive osseous process. Lumbar spine degenerative changes have not significant
ly progressed and there is redemonstration of bilateral neural foraminal stenosis at L4-L5 and on the
 left more so than right at L5-S1. Narrowing of the central canal filled greatest at L4-L5 has not si
gnificant changed.



Miscellaneous: None.



Impression:



1.  No acute abnormality seen to involve the liver, gallbladder/biliary tree or pancreas to explain t
he patient's right upper quadrant pain.

2.  Small supraumbilical hernia that has slightly increased in size from 10/4/2019 and it now appears
 as if a tiny segment of small bowel (just the anterior wall) partially herniates into the defect. No
 bowel obstruction or CT manifestations of incarceration but correlate for pinpoint tenderness.

3.  Additional unchanged/chronic findings discussed in the body the report.



Electronically signed by: MONSERRAT MARIE MD (12/10/2020 11:16 AM) [id number]

## 2021-02-26 ENCOUNTER — HOSPITAL ENCOUNTER (OUTPATIENT)
Dept: HOSPITAL 63 - CT | Age: 51
End: 2021-02-26
Payer: MEDICARE

## 2021-02-26 DIAGNOSIS — K40.90: ICD-10-CM

## 2021-02-26 DIAGNOSIS — K42.9: ICD-10-CM

## 2021-02-26 DIAGNOSIS — N28.1: ICD-10-CM

## 2021-02-26 DIAGNOSIS — M47.819: ICD-10-CM

## 2021-02-26 DIAGNOSIS — R16.2: Primary | ICD-10-CM

## 2021-02-26 PROCEDURE — 74177 CT ABD & PELVIS W/CONTRAST: CPT

## 2021-02-26 NOTE — RAD
EXAM: CT Abdomen and Pelvis with IV contrast



CLINICAL HISTORY: Right upper quadrant pain 



COMPARISON: 12/10/2020 10/4/2019



TECHNIQUE: Helical CT of the abdomen and pelvis was performed following the administration of intrave
nous contrast. Axial, coronal and sagittal reformatted images were generated.



PQRS compliance statement - One or more of the following individualized dose reduction techniques wer
e utilized for this study:

1.  Automated exposure control

2.  Adjustment of the mA and/or kV according to patient size

3.  Use of iterative reconstruction technique



FINDINGS:

Lower Chest: 

Lung bases are clear.



Abdomen and Pelvis: 

Liver measures approximately 18.3 cm in length. Hepatic hypoattenuation likely fatty liver. Spleen me
asures 17.2 cm in length. 



Adrenal glands and pancreas are unremarkable. Gallbladder is normal. No biliary ductal dilatation.



Left lower pole renal cysts are seen. No hydronephrosis. No hydroureter. Bladder is unremarkable.



Moderate colonic stool content is seen. No small or large bowel dilatation. No bowel obstruction. Amanda
endix is normal.



No abdominal or pelvic lymphadenopathy. No abdominal pelvic ascites. Fat-containing inguinal hernias 
and trace fat-containing periumbilical hernia.



Bones: 

Degenerative changes of the spine. No aggressive osseous lesion.



IMPRESSION: 

1.  Hepatic hypoattenuation likely fatty liver. Liver is borderline enlarged.

2.  Spleen is mildly enlarged measuring 17.2 cm in length.

3.  Moderate colonic stool content. No bowel obstruction.



Electronically signed by: Pipo Guevara MD (2/26/2021 3:51 PM) [id number]

## 2021-03-22 ENCOUNTER — HOSPITAL ENCOUNTER (OUTPATIENT)
Dept: HOSPITAL 63 - US | Age: 51
End: 2021-03-22
Payer: MEDICARE

## 2021-03-22 DIAGNOSIS — K76.0: Primary | ICD-10-CM

## 2021-03-22 PROCEDURE — 76705 ECHO EXAM OF ABDOMEN: CPT

## 2021-03-22 NOTE — RAD
EXAMINATION: RIGHT UPPER QUADRANT ULTRASOUND  



CLINICAL HISTORY: RUQ PAIN, FATTY LIVER ON RECENT CT 



TECHNIQUE: Sonography of the right upper quadrant was performed.  



COMPARISON: CT abdomen/pelvis 2/26/2021





FINDINGS: 



Pancreas: Poorly visualized.    

   

Liver: Increased parenchymal echogenicity and beam attenuation, compatible with steatosis. No focal h
epatic lesion visualized.  

     

Biliary: No intrahepatic biliary duct dilation.    

     - CBD: 6 mm.

     - Gallbladder: Normal caliber.

          - Contents: No cholelithiasis.

          - Wall: No abnormal thickening.

          - Other: No pericholecystic fluid.



Right Kidney: Measures 12.8 cm in length. No hydronephrosis or focal lesion.  



Ascites: None.



Aorta/IVC: Poorly visualized.





IMPRESSION:  



Hepatic steatosis.



Poorly visualized pancreas.



Electronically signed by: Foster Stock DO (3/22/2021 10:48 AM) [id number]

## 2021-03-26 ENCOUNTER — HOSPITAL ENCOUNTER (OUTPATIENT)
Dept: HOSPITAL 61 - LAB | Age: 51
End: 2021-03-26
Payer: MEDICARE

## 2021-03-26 DIAGNOSIS — Z20.822: ICD-10-CM

## 2021-03-26 DIAGNOSIS — Z01.812: Primary | ICD-10-CM

## 2021-03-26 DIAGNOSIS — K22.2: ICD-10-CM

## 2021-03-26 PROCEDURE — U0003 INFECTIOUS AGENT DETECTION BY NUCLEIC ACID (DNA OR RNA); SEVERE ACUTE RESPIRATORY SYNDROME CORONAVIRUS 2 (SARS-COV-2) (CORONAVIRUS DISEASE [COVID-19]), AMPLIFIED PROBE TECHNIQUE, MAKING USE OF HIGH THROUGHPUT TECHNOLOGIES AS DESCRIBED BY CMS-2020-01-R: HCPCS

## 2021-03-29 ENCOUNTER — HOSPITAL ENCOUNTER (OUTPATIENT)
Dept: HOSPITAL 61 - SURG | Age: 51
Discharge: HOME | End: 2021-03-29
Payer: MEDICARE

## 2021-03-29 VITALS
DIASTOLIC BLOOD PRESSURE: 58 MMHG | DIASTOLIC BLOOD PRESSURE: 58 MMHG | SYSTOLIC BLOOD PRESSURE: 127 MMHG | SYSTOLIC BLOOD PRESSURE: 127 MMHG

## 2021-03-29 DIAGNOSIS — J43.9: ICD-10-CM

## 2021-03-29 DIAGNOSIS — Z79.899: ICD-10-CM

## 2021-03-29 DIAGNOSIS — E78.00: ICD-10-CM

## 2021-03-29 DIAGNOSIS — K21.00: ICD-10-CM

## 2021-03-29 DIAGNOSIS — Z88.0: ICD-10-CM

## 2021-03-29 DIAGNOSIS — G47.30: ICD-10-CM

## 2021-03-29 DIAGNOSIS — I10: ICD-10-CM

## 2021-03-29 DIAGNOSIS — Z86.010: ICD-10-CM

## 2021-03-29 DIAGNOSIS — E66.9: ICD-10-CM

## 2021-03-29 DIAGNOSIS — I25.10: ICD-10-CM

## 2021-03-29 DIAGNOSIS — Z12.11: Primary | ICD-10-CM

## 2021-03-29 DIAGNOSIS — M19.90: ICD-10-CM

## 2021-03-29 DIAGNOSIS — Z98.890: ICD-10-CM

## 2021-03-29 DIAGNOSIS — F41.9: ICD-10-CM

## 2021-03-29 DIAGNOSIS — R13.10: ICD-10-CM

## 2021-03-29 DIAGNOSIS — Z79.82: ICD-10-CM

## 2021-03-29 DIAGNOSIS — F17.210: ICD-10-CM

## 2021-03-29 DIAGNOSIS — D12.3: ICD-10-CM

## 2021-03-29 DIAGNOSIS — Z72.89: ICD-10-CM

## 2021-03-29 DIAGNOSIS — K64.0: ICD-10-CM

## 2021-03-29 DIAGNOSIS — E11.9: ICD-10-CM

## 2021-03-29 DIAGNOSIS — K63.89: ICD-10-CM

## 2021-03-29 PROCEDURE — 43450 DILATE ESOPHAGUS 1/MULT PASS: CPT

## 2021-03-29 PROCEDURE — 45380 COLONOSCOPY AND BIOPSY: CPT

## 2021-03-31 ENCOUNTER — HOSPITAL ENCOUNTER (OUTPATIENT)
Dept: HOSPITAL 63 - SURG | Age: 51
Discharge: HOME | End: 2021-03-31
Attending: ANESTHESIOLOGY
Payer: MEDICARE

## 2021-03-31 VITALS — SYSTOLIC BLOOD PRESSURE: 176 MMHG | DIASTOLIC BLOOD PRESSURE: 85 MMHG

## 2021-03-31 DIAGNOSIS — G47.33: ICD-10-CM

## 2021-03-31 DIAGNOSIS — M19.90: ICD-10-CM

## 2021-03-31 DIAGNOSIS — M79.604: ICD-10-CM

## 2021-03-31 DIAGNOSIS — K44.9: ICD-10-CM

## 2021-03-31 DIAGNOSIS — I25.10: ICD-10-CM

## 2021-03-31 DIAGNOSIS — Z79.899: ICD-10-CM

## 2021-03-31 DIAGNOSIS — M54.5: Primary | ICD-10-CM

## 2021-03-31 DIAGNOSIS — J44.9: ICD-10-CM

## 2021-03-31 DIAGNOSIS — I10: ICD-10-CM

## 2021-03-31 DIAGNOSIS — Z79.82: ICD-10-CM

## 2021-03-31 DIAGNOSIS — M79.605: ICD-10-CM

## 2021-03-31 PROCEDURE — G0463 HOSPITAL OUTPT CLINIC VISIT: HCPCS

## 2021-03-31 PROCEDURE — 99213 OFFICE O/P EST LOW 20 MIN: CPT

## 2021-04-02 NOTE — PATHOLOGY
[facility] Accession Number: [id number]

.                                                                01

Material submitted:                                        .

colon - DISTAL TRANSVERSE COLON BIOPSY POLYP. Modifiers: distal, transverse

.                                                                01

Clinical history:                                          .

DYSPHAGIA, EPIGASTRIC PAIN

EGD/COLONOSCOPY

.                                                                02

**********************************************************************

Diagnosis:

Large bowel "distal transverse colon polyp, endoscopic biopsy:

  - Tubular adenoma; negative for high-grade dysplasia and malignancy.

(MLK:pit; 03/31/2021)

QTP  03/31/2021  1333 Local

**********************************************************************

.                                                                02

Electronically signed:                                     .

Elkin Villanueva MD, Pathologist

NPI- [id number]

.                                                                01

Gross description:                                         .

The specimen is received in formalin, labeled "Lucio Waton, distal

transverse biopsy".  Received are two segments of pale tan tissue

measuring 0.2 and 0.3 cm in maximum dimensions.  The specimen is submitted

entirely in cassette A1.

([facility]; 3/30/2021)

QAC/QAC  03/30/2021  1738 Local

.                                                                02

Pathologist provided ICD-10:

D12.3

.                                                                02

CPT                                                        .

[id number]

Specimen Comment: A courtesy copy of this report has been sent to [phone], [phone]

Specimen Comment: [id number]

Specimen Comment: Report sent to  / DR FLOWERS

***Performed at:  01

   [facility]

   [address], [place], [place]  [id number]

   MD Jesus Fajardo MD Phone:  [phone]

***Performed at:  02

   [facility]

   [address], [place]  [id number]

   MD Sachin Austin MD Phone:  [phone]

## 2021-04-16 ENCOUNTER — HOSPITAL ENCOUNTER (EMERGENCY)
Dept: HOSPITAL 63 - ER | Age: 51
Discharge: HOME | End: 2021-04-16
Payer: MEDICARE

## 2021-04-16 VITALS — BODY MASS INDEX: 41.75 KG/M2 | WEIGHT: 315 LBS | HEIGHT: 73 IN

## 2021-04-16 VITALS — DIASTOLIC BLOOD PRESSURE: 72 MMHG | SYSTOLIC BLOOD PRESSURE: 132 MMHG

## 2021-04-16 DIAGNOSIS — I25.10: ICD-10-CM

## 2021-04-16 DIAGNOSIS — F03.90: ICD-10-CM

## 2021-04-16 DIAGNOSIS — R10.13: Primary | ICD-10-CM

## 2021-04-16 DIAGNOSIS — E11.9: ICD-10-CM

## 2021-04-16 DIAGNOSIS — I10: ICD-10-CM

## 2021-04-16 DIAGNOSIS — E66.9: ICD-10-CM

## 2021-04-16 DIAGNOSIS — F17.210: ICD-10-CM

## 2021-04-16 DIAGNOSIS — J44.9: ICD-10-CM

## 2021-04-16 DIAGNOSIS — Z88.0: ICD-10-CM

## 2021-04-16 LAB
ALBUMIN SERPL-MCNC: 3.6 G/DL (ref 3.4–5)
ALBUMIN/GLOB SERPL: 1.2 {RATIO} (ref 1–1.7)
ALP SERPL-CCNC: 79 U/L (ref 46–116)
ALT SERPL-CCNC: 36 U/L (ref 16–63)
ANION GAP SERPL CALC-SCNC: 7 MMOL/L (ref 6–14)
AST SERPL-CCNC: 17 U/L (ref 15–37)
BASOPHILS # BLD AUTO: 0.1 X10^3/UL (ref 0–0.2)
BASOPHILS NFR BLD: 1 % (ref 0–3)
BILIRUB SERPL-MCNC: 0.4 MG/DL (ref 0.2–1)
BUN/CREAT SERPL: 11 (ref 6–20)
CA-I SERPL ISE-MCNC: 15 MG/DL (ref 8–26)
CALCIUM SERPL-MCNC: 8.8 MG/DL (ref 8.5–10.1)
CHLORIDE SERPL-SCNC: 104 MMOL/L (ref 98–107)
CO2 SERPL-SCNC: 28 MMOL/L (ref 21–32)
CREAT SERPL-MCNC: 1.4 MG/DL (ref 0.7–1.3)
EOSINOPHIL NFR BLD: 0.2 X10^3/UL (ref 0–0.7)
EOSINOPHIL NFR BLD: 2 % (ref 0–3)
ERYTHROCYTE [DISTWIDTH] IN BLOOD BY AUTOMATED COUNT: 14.5 % (ref 11.5–14.5)
GFR SERPLBLD BASED ON 1.73 SQ M-ARVRAT: 53.6 ML/MIN
GLOBULIN SER-MCNC: 3.1 G/DL (ref 2.2–3.8)
GLUCOSE SERPL-MCNC: 212 MG/DL (ref 70–99)
HCT VFR BLD CALC: 47.9 % (ref 39–53)
HGB BLD-MCNC: 16.3 G/DL (ref 13–17.5)
LYMPHOCYTES # BLD: 1.6 X10^3/UL (ref 1–4.8)
LYMPHOCYTES NFR BLD AUTO: 18 % (ref 24–48)
MAGNESIUM SERPL-MCNC: 1.8 MG/DL (ref 1.8–2.4)
MCH RBC QN AUTO: 29 PG (ref 25–35)
MCHC RBC AUTO-ENTMCNC: 34 G/DL (ref 31–37)
MCV RBC AUTO: 85 FL (ref 79–100)
MONO #: 0.4 X10^3/UL (ref 0–1.1)
MONOCYTES NFR BLD: 5 % (ref 0–9)
NEUT #: 6.7 X10^3UL (ref 1.8–7.7)
NEUTROPHILS NFR BLD AUTO: 75 % (ref 31–73)
PLATELET # BLD AUTO: 214 X10^3/UL (ref 140–400)
POTASSIUM SERPL-SCNC: 4.2 MMOL/L (ref 3.5–5.1)
PROT SERPL-MCNC: 6.7 G/DL (ref 6.4–8.2)
RBC # BLD AUTO: 5.65 X10^6/UL (ref 4.3–5.7)
SODIUM SERPL-SCNC: 139 MMOL/L (ref 136–145)
WBC # BLD AUTO: 9 X10^3/UL (ref 4–11)

## 2021-04-16 PROCEDURE — 36415 COLL VENOUS BLD VENIPUNCTURE: CPT

## 2021-04-16 PROCEDURE — 71045 X-RAY EXAM CHEST 1 VIEW: CPT

## 2021-04-16 PROCEDURE — 86803 HEPATITIS C AB TEST: CPT

## 2021-04-16 PROCEDURE — 83880 ASSAY OF NATRIURETIC PEPTIDE: CPT

## 2021-04-16 PROCEDURE — 86705 HEP B CORE ANTIBODY IGM: CPT

## 2021-04-16 PROCEDURE — 84484 ASSAY OF TROPONIN QUANT: CPT

## 2021-04-16 PROCEDURE — 85025 COMPLETE CBC W/AUTO DIFF WBC: CPT

## 2021-04-16 PROCEDURE — 83735 ASSAY OF MAGNESIUM: CPT

## 2021-04-16 PROCEDURE — 86709 HEPATITIS A IGM ANTIBODY: CPT

## 2021-04-16 PROCEDURE — 87340 HEPATITIS B SURFACE AG IA: CPT

## 2021-04-16 PROCEDURE — 93005 ELECTROCARDIOGRAM TRACING: CPT

## 2021-04-16 PROCEDURE — 80053 COMPREHEN METABOLIC PANEL: CPT

## 2021-04-16 PROCEDURE — 83690 ASSAY OF LIPASE: CPT

## 2021-04-16 NOTE — RAD
Exam: Chest one view



INDICATION: Chest pain



TECHNIQUE: Frontal view of the chest



Comparisons: 11/20/2020



FINDINGS:

The cardiomediastinal silhouette and pulmonary vessels are within normal limits.



The lung and pleural spaces are clear.



IMPRESSION:

No acute cardiopulmonary process.



Electronically signed by: Lauren Silva MD (4/16/2021 10:18 PM) NEVA

## 2021-04-16 NOTE — PHYS DOC
Past History


Past Medical History:  CAD, COPD, Dementia, Diabetes, Hypertension


Additional Past Medical Histor:  back pain


Past Surgical History:  No Surgical History


Smoking:  Cigarettes


Alcohol Use:  None


Drug Use:  None





Adult General


Chief Complaint


Chief Complaint:  CHEST PAIN





HPI


HPI


Patient is a 51-year-old male with a past medical history significant for 

obesity, hypertension, hyperlipidemia, and heartburn who presents with a chief 

complaint of epigastric pain.  States this has been going on intermittently over

the last 4 to 6 weeks.  Cannot identify any aggravating or alleviating factors. 

States about a week to 10 days ago he got an EGD and colonoscopy.  States the 

EGD was normal and he had a small polyp removed with a colonoscopy.  Denies any 

complications since then.  States that the symptoms started long before these 

interventions.  Denies any chest pain, shortness of breath, nausea, vomiting, 

diarrhea.  Denies any dyspnea on exertion, orthopnea, PND or edema.  States he 

is eating and drinking normally for him.  States he is making urine and stool 

normally for him with no blood in either.  Denies any recent traumas, travels, 

fevers, known ill contacts.  Denies any history of VTE.





Review of Systems


Review of Systems


Review of systems otherwise unremarkable except noted in HPI





Allergies


Allergies





Allergies








Coded Allergies Type Severity Reaction Last Updated Verified


 


  Penicillins Allergy Severe anaphylaxis 11/11/20 Yes











Physical Exam


Physical Exam





Constitutional: Well developed, well nourished, no acute distress, non-toxic 

appearance. []


HENT: Normocephalic, atraumatic, bilateral external ears normal, oropharynx 

moist, no oral exudates, nose normal. []


Eyes: conjunctiva normal, no discharge. [] 


Neck: Normal range of motion, no tenderness, supple, no stridor. [] 


Cardiovascular:Heart rate regular rhythm, no murmur []


Lungs & Thorax:  Bilateral breath sounds clear to auscultation []


Abdomen: soft, no tenderness, no masses, no pulsatile masses. [] 


Skin: Warm, dry, no erythema, no rash. [] 


Back:  no CVA tenderness. [] 


Extremities: No tenderness, no cyanosis, no clubbing, ROM intact, no edema. [] 


Neurologic: Alert and oriented X 3, normal motor function, normal sensory 

function, no focal deficits noted. []


Psychologic: Affect normal, judgement normal, mood normal. []





EKG


EKG


EKG with a rate of 89, QRS of 86, QTc of 402.  No STEMI.  PACs present.  []





Radiology/Procedures


Radiology/Procedures


[]Exam: Chest one view





INDICATION: Chest pain





TECHNIQUE: Frontal view of the chest





Comparisons: 11/20/2020





FINDINGS:


The cardiomediastinal silhouette and pulmonary vessels are within normal limits.





The lung and pleural spaces are clear.





IMPRESSION:


No acute cardiopulmonary process.





Heart Score


C/O Chest Pain:  No


Risk Factors:


Risk Factors:  DM, Current or recent (<one month) smoker, HTN, HLP, family 

history of CAD, obesity.


Risk Scores:


Risk Factors:  DM, Current or recent (<one month) smoker, HTN, HLP, family h

istory of CAD, obesity.





Course & Med Decision Making


Course & Med Decision Making


Patient is a 51-year-old male who presents with epigastric pain for 4 to 6 weeks


Vital signs not concerning.  Physical exam noted above.  Patient given aspirin. 

 EKG noted above and normal.  Troponin normal.  Low risk Wells.  PERC negative.


Laboratory analysis not concerning.  Patient asymptomatic in the ED.  On 

reassessment patient stated he was feeling better and was ready to be discharged

 home.  Discussed all findings with patient.


Advised to follow-up first thing Monday morning with his primary care physician 

to discuss his ED visit and set up a follow-up as soon as possible.


Gave strict return precautions to the emergency department.  Patient grateful, 

verbalized understanding and agreed with plan of discharge.





Dragon Disclaimer


Dragon Disclaimer


This electronic medical record was generated, in whole or in part, using a voice

 recognition dictation system.





Departure


Departure:


Impression:  


   Primary Impression:  


   Epigastric pain


Disposition:  01 HOME / SELF CARE / HOMELESS


Condition:  GOOD


Referrals:  


RADHA FLOWERS (PCP)


Patient Instructions:  Abdominal Pain, Chest Pain (Nonspecific), Easy-to-Read





Additional Instructions:  


Please read all of the attached information carefully.  Your laboratory analysis

 today was not concerning.  Your EKG and troponin which looks at your heart did 

not indicate a heart attack (myocardial infarction) today as discussed.  As 

discussed, please take all your medications as prescribed daily and follow-up 

first thing Monday morning with your primary care physician.  When you call your

 primary care physician first thing Monday morning update on ED visit and set up

 a follow-up as soon as you can this week.  As discussed, please come back to 

the emergency department immediately with new or concerning symptoms.











KOFFI KERNS MD               Apr 16, 2021 21:16

## 2021-04-16 NOTE — EKG
[facility] [address], [place] [address]

Test Date:    2021               Test Time:    20:44:02

Pat Name:     ADELFO REDMAN              Department:   

Patient ID:   [id number]           Room:          

Gender:       M                        Technician:   

:          1970               Requested By: KOFFI KERNS

Order Number: [id number]            Reading MD:     

                                 Measurements

Intervals                              Axis          

Rate:         89                       P:            90

NC:           144                      QRS:          72

QRSD:         86                       T:            34

QT:           330                                    

QTc:          402                                    

                           Interpretive Statements

SINUS RHYTHM

ATRIAL PREMATURE COMPLEX(ES)

CONSIDER RIGHT VENTRICULAR HYPERTROPHY

QRS(T) CONTOUR ABNORMALITY

CONSISTENT WITH ANTEROSEPTAL INFARCT

AGE UNDETERMINED

ABNORMAL ECG

RI6.02

No previous ECG available for comparison

## 2021-05-05 ENCOUNTER — HOSPITAL ENCOUNTER (OUTPATIENT)
Dept: HOSPITAL 63 - SURG | Age: 51
Discharge: HOME | End: 2021-05-05
Attending: ANESTHESIOLOGY
Payer: MEDICARE

## 2021-05-05 VITALS
DIASTOLIC BLOOD PRESSURE: 76 MMHG | SYSTOLIC BLOOD PRESSURE: 148 MMHG | DIASTOLIC BLOOD PRESSURE: 76 MMHG | SYSTOLIC BLOOD PRESSURE: 148 MMHG

## 2021-05-05 DIAGNOSIS — Z79.899: ICD-10-CM

## 2021-05-05 DIAGNOSIS — Z86.73: ICD-10-CM

## 2021-05-05 DIAGNOSIS — E78.5: ICD-10-CM

## 2021-05-05 DIAGNOSIS — G47.33: ICD-10-CM

## 2021-05-05 DIAGNOSIS — I25.2: ICD-10-CM

## 2021-05-05 DIAGNOSIS — M54.16: Primary | ICD-10-CM

## 2021-05-05 DIAGNOSIS — K21.9: ICD-10-CM

## 2021-05-05 DIAGNOSIS — I25.118: ICD-10-CM

## 2021-05-05 DIAGNOSIS — E03.0: ICD-10-CM

## 2021-05-05 DIAGNOSIS — Z88.0: ICD-10-CM

## 2021-05-05 DIAGNOSIS — E11.9: ICD-10-CM

## 2021-05-05 DIAGNOSIS — Z79.82: ICD-10-CM

## 2021-05-05 DIAGNOSIS — I10: ICD-10-CM

## 2021-05-05 DIAGNOSIS — J44.9: ICD-10-CM

## 2021-05-05 PROCEDURE — 62323 NJX INTERLAMINAR LMBR/SAC: CPT

## 2021-07-09 ENCOUNTER — HOSPITAL ENCOUNTER (OUTPATIENT)
Dept: HOSPITAL 61 - ECHO | Age: 51
End: 2021-07-09
Attending: INTERNAL MEDICINE
Payer: MEDICARE

## 2021-07-09 DIAGNOSIS — I51.7: Primary | ICD-10-CM

## 2021-07-09 DIAGNOSIS — I25.119: ICD-10-CM

## 2021-07-09 PROCEDURE — C8929 TTE W OR WO FOL WCON,DOPPLER: HCPCS

## 2021-07-09 PROCEDURE — 93306 TTE W/DOPPLER COMPLETE: CPT

## 2021-07-09 NOTE — CARD
MR#: [id number]

Account#: [id number]

Accession#: [id number]

Date of Study: 07/09/2021

Ordering Physician: SHAHBAZ HAND, 

Referring Physician: SHAHBAZ HAND, 

Tech: Zoya Da Silva [facility]





--------------- APPROVED REPORT --------------





EXAM: Two-dimensional and M-mode echocardiogram with Doppler and color Doppler.



Other Information 

Quality : Technically LimitedHR: 102bpm

Rhythm : NSRTechnically limited study due to  body habitus.



INDICATION

Cardiac Disease: CAD 



RISK FACTORS

Hypertension 

Obesity   

Hyperlipidemia

Diabetes

Smoking 



 LEFT VENTRICLE 

The left ventricle is normal size. There is borderline to mild concentric left ventricular hypertroph
y. The left ventricular systolic function is normal and the ejection fraction is within normal range.
 Estimated ejejction fraction 55-60%. There is normal LV segmental wall motion. Transmitral Doppler f
low pattern is Grade I-abnormal relaxation pattern.



 RIGHT VENTRICLE 

The right ventricle is normal size. The right ventricle is mildly hypertrophied. The right ventricula
r systolic function is normal.



 ATRIA 

The left atrium size is normal. The right atrium size is normal. The interatrial septum is intact wit
h no evidence for an atrial septal defect or patent foramen ovale as noted on 2-D or Doppler imaging.




 AORTIC VALVE 

Not well visualized. Doppler and Color Flow revealed no significant aortic regurgitation. There is no
 significant aortic valvular stenosis.



 MITRAL VALVE 

The mitral valve is normal in structure and function. There is no evidence of mitral valve prolapse. 
There is no mitral valve stenosis. Doppler and Color Flow revealed no mitral valve regurgitation note
d.



 TRICUSPID VALVE 

Not well visualized. Doppler and Color Flow revealed no tricuspid valve regurgitation noted. There is
 no tricuspid valve stenosis.



 PULMONIC VALVE 

Not well visualized. Doppler and Color Flow revealed no pulmonic valvular regurgitation. There is no 
pulmonic valvular stenosis.



 GREAT VESSELS 

The aortic root is normal in size. The ascending aorta is normal in size. Due to poor image quality, 
the IVC could not be assessed.



 PERICARDIAL EFFUSION 

There is no evidence of significant pericardial effusion.



Critical Notification

Critical Value: No



<Conclusion>

The left ventricular systolic function is normal and the ejection fraction is within normal range.  E
stimated ejejction fraction 55-60%.

There is normal LV segmental wall motion.

Technically difficult study. 



Signed by : Octaviano Aponte, 

Electronically Approved : 07/09/2021 19:09:54

## 2021-08-31 ENCOUNTER — HOSPITAL ENCOUNTER (OUTPATIENT)
Dept: HOSPITAL 63 - RAD | Age: 51
End: 2021-08-31
Attending: PHYSICIAN ASSISTANT
Payer: MEDICARE

## 2021-08-31 DIAGNOSIS — M25.762: ICD-10-CM

## 2021-08-31 DIAGNOSIS — M23.42: ICD-10-CM

## 2021-08-31 DIAGNOSIS — M76.892: ICD-10-CM

## 2021-08-31 DIAGNOSIS — M17.12: Primary | ICD-10-CM

## 2021-08-31 NOTE — RAD
EXAM: Bilateral knees, 2 views.



HISTORY: Pain. Prior left meniscus surgery.



COMPARISON: None.



FINDINGS: 2 views of both knees are obtained. There is mild left medial compartment and minimal left 
patellofemoral compartment spurring. There is a small joint loose body overlying the left lateral com
partment. There is a left intra-articular osteophyte projecting from the tibial plateau. There is no 
fracture, dislocation or subluxation. There is no segmental joint effusion.



IMPRESSION: 

1. Mild medial compartment and minimal patellofemoral compartment osteoarthritis of the left knee wit
h joint loose body.

2. No acute osseous finding.



Electronically signed by: Ebony Bear MD (8/31/2021 5:43 PM) [id number]

## 2021-09-18 ENCOUNTER — HOSPITAL ENCOUNTER (EMERGENCY)
Dept: HOSPITAL 63 - ER | Age: 51
Discharge: HOME | End: 2021-09-18
Payer: MEDICARE

## 2021-09-18 VITALS — DIASTOLIC BLOOD PRESSURE: 75 MMHG | SYSTOLIC BLOOD PRESSURE: 157 MMHG

## 2021-09-18 VITALS — BODY MASS INDEX: 41.75 KG/M2 | HEIGHT: 73 IN | WEIGHT: 315 LBS

## 2021-09-18 DIAGNOSIS — Z88.0: ICD-10-CM

## 2021-09-18 DIAGNOSIS — I25.10: ICD-10-CM

## 2021-09-18 DIAGNOSIS — E78.00: ICD-10-CM

## 2021-09-18 DIAGNOSIS — Z86.73: ICD-10-CM

## 2021-09-18 DIAGNOSIS — I10: ICD-10-CM

## 2021-09-18 DIAGNOSIS — E11.65: Primary | ICD-10-CM

## 2021-09-18 LAB
ANION GAP SERPL CALC-SCNC: 8 MMOL/L (ref 6–14)
BASOPHILS # BLD AUTO: 0.1 X10^3/UL (ref 0–0.2)
BASOPHILS NFR BLD: 1 % (ref 0–3)
CA-I SERPL ISE-MCNC: 13 MG/DL (ref 8–26)
CALCIUM SERPL-MCNC: 8.4 MG/DL (ref 8.5–10.1)
CHLORIDE SERPL-SCNC: 99 MMOL/L (ref 98–107)
CO2 SERPL-SCNC: 29 MMOL/L (ref 21–32)
CREAT SERPL-MCNC: 1.2 MG/DL (ref 0.7–1.3)
EOSINOPHIL NFR BLD: 0.1 X10^3/UL (ref 0–0.7)
EOSINOPHIL NFR BLD: 2 % (ref 0–3)
ERYTHROCYTE [DISTWIDTH] IN BLOOD BY AUTOMATED COUNT: 14.3 % (ref 11.5–14.5)
GFR SERPLBLD BASED ON 1.73 SQ M-ARVRAT: 64.1 ML/MIN
GLUCOSE SERPL-MCNC: 357 MG/DL (ref 70–99)
HCT VFR BLD CALC: 46.6 % (ref 39–53)
HGB BLD-MCNC: 15.9 G/DL (ref 13–17.5)
LYMPHOCYTES # BLD: 1.3 X10^3/UL (ref 1–4.8)
LYMPHOCYTES NFR BLD AUTO: 19 % (ref 24–48)
MCH RBC QN AUTO: 30 PG (ref 25–35)
MCHC RBC AUTO-ENTMCNC: 34 G/DL (ref 31–37)
MCV RBC AUTO: 88 FL (ref 79–100)
MONO #: 0.4 X10^3/UL (ref 0–1.1)
MONOCYTES NFR BLD: 5 % (ref 0–9)
NEUT #: 5.1 X10^3UL (ref 1.8–7.7)
NEUTROPHILS NFR BLD AUTO: 73 % (ref 31–73)
PLATELET # BLD AUTO: 197 X10^3/UL (ref 140–400)
POTASSIUM SERPL-SCNC: 3.9 MMOL/L (ref 3.5–5.1)
RBC # BLD AUTO: 5.31 X10^6/UL (ref 4.3–5.7)
SODIUM SERPL-SCNC: 136 MMOL/L (ref 136–145)
WBC # BLD AUTO: 6.9 X10^3/UL (ref 4–11)

## 2021-09-18 PROCEDURE — 99284 EMERGENCY DEPT VISIT MOD MDM: CPT

## 2021-09-18 PROCEDURE — 84484 ASSAY OF TROPONIN QUANT: CPT

## 2021-09-18 PROCEDURE — 85025 COMPLETE CBC W/AUTO DIFF WBC: CPT

## 2021-09-18 PROCEDURE — 96374 THER/PROPH/DIAG INJ IV PUSH: CPT

## 2021-09-18 PROCEDURE — 93005 ELECTROCARDIOGRAM TRACING: CPT

## 2021-09-18 PROCEDURE — 36415 COLL VENOUS BLD VENIPUNCTURE: CPT

## 2021-09-18 PROCEDURE — 82947 ASSAY GLUCOSE BLOOD QUANT: CPT

## 2021-09-18 PROCEDURE — 80048 BASIC METABOLIC PNL TOTAL CA: CPT

## 2021-09-18 PROCEDURE — 96361 HYDRATE IV INFUSION ADD-ON: CPT

## 2021-09-18 NOTE — PHYS DOC
Past History


Past Medical History:  Cancer, GERD, High Cholesterol, Heart Disease


Additional Past Medical Histor:  back pain


Past Surgical History:  Cancer Surgery, Knee Replacement


Smoking:  Cigarettes


Alcohol Use:  None


Drug Use:  None





Adult General


Chief Complaint


Chief Complaint:  HYPERGLYCEMIA





HPI


HPI





Patient is a 51-year-old male presenting for hyperglycemia.  He has poor health 

literacy and extensive past medical issues pertinent for uncontrolled type 2 

diabetes, history of CAD and CVA, hypertension and hypercholesterol.  Patient 

reports feeling more tired than usual today when waking up.  States this 

persisted for several hours without any known inciting event, trauma, ingestion 

or exposure.  Reports his sugar is usually high when he feels like this 

prompting his wife to check his fingerstick blood glucose which read 350.  

Patient states he ate fruit and drink juice which he does in attempt to lower 

his blood sugar but this did the office it prompting him to get concerned and 

arrived at our ER for evaluation.  He has no major complaints besides ongoing 

fatigue and lack of energy.  He does admit he is had poor p.o. fluid intake and 

feels dehydrated, no fever, blurred vision, ripping or tearing sensation in 

chest, chest pain, cough, abdominal pain, nausea vomiting diarrhea, urinary 

symptoms





Review of Systems


Review of Systems


Fourteen body systems of review of systems have been reviewed. See HPI for 

pertinent positives and negative responses, other wise all other systems are 

negative, non-pertinent or non-contributory





Allergies


Allergies





Allergies








Coded Allergies Type Severity Reaction Last Updated Verified


 


  Penicillins Allergy Severe anaphylaxis 11/11/20 Yes











Physical Exam


Physical Exam


Constitutional: Age-appropriate male who is obese but in no acute distress, 

nontoxic in appearance, poor overall hygiene


HENT: Normocephalic, atraumatic, bilateral external ears normal, oropharynx 

moist, no oral exudates, nose normal. 


Eyes: PERRLA, EOMI, conjunctiva normal, no discharge.  


Neck: Normal range of motion, no tenderness, supple, no stridor.  


Cardiovascular: Heart rate regular, sinus rhythm, no murmurs rubs or gallops


Lungs & Thorax:  Bilateral breath sounds clear to auscultation 


Abdomen: Bowel sounds normal, soft and protuberant, no tenderness, no masses, no

pulsatile masses.  Nonsurgical abdomen, no peritoneal signs


Skin: Warm, dry, no erythema, no rash.  


Back: No tenderness, no CVA tenderness.  


Extremities: No tenderness, no cyanosis, no clubbing, ROM intact, no edema.  


Neurologic: Alert and oriented X 3, grossly normal motor & sensory function, no 

focal deficits noted. 


Psychologic: Affect normal, judgement normal, mood normal.





Current Patient Data


Vital Signs





                                   Vital Signs








  Date Time  Temp Pulse Resp B/P (MAP) Pulse Ox O2 Delivery O2 Flow Rate FiO2


 


9/18/21 15:44  92 20 157/75 (102) 97   








Lab Results





                                Laboratory Tests








Test


 9/18/21


15:38


 


Glucose (Fingerstick)


 366 mg/dL


(70-99)  H











EKG


EKG


EKG ordered and interpreted by myself at 1703 hrs. as sinus rhythm at 81 bpm, 

unremarkable intervals, no axis deviation, no acute ischemic findings, no STEMI





Radiology/Procedures


Radiology/Procedures


[]





Heart Score


C/O Chest Pain:  No


HEART Score for Chest Pain:  








HEART Score for Chest Pain Response (Comments) Value


 


History Slighlty/Non-Suspicious 0


 


ECG Normal 0


 


Age >45 - < 65 1


 


Risk Factors >3 Risk Factors or Hx CAD 2


 


Troponin < Normal Limit 0


 


Total  3








Risk Factors:


Risk Factors:  DM, Current or recent (<one month) smoker, HTN, HLP, family hist

ory of CAD, obesity.


Risk Scores:


Risk Factors:  DM, Current or recent (<one month) smoker, HTN, HLP, family 

history of CAD, obesity.





Course & Med Decision Making


Course & Med Decision Making


ABCs unremarkable.  Patient presenting for high blood glucose and feelings of 

being dehydrated.  IV fluid and IV insulin administered.  Patient has poor 

health literacy, extensive time was taken to educate patient and significant 

other on diabetes management.  They were under the impression that after 

googling blood sugar emergencies, that drinking juice and eating fruit would 

decrease their blood sugar, I discussed that is the office it and they probably 

read an article about hypoglycemia.  Regardless, no indication for further 

diagnostic work-up and/or intervention or need for hospitalization based on ER 

visit today.  He has good access to primary care physician, after educating 

patient and significant other extensively on diabetes, I discussed need to keep 

a dedicated fingerstick blood glucose log for review in outpatient setting.  He 

has all resources he needs at home such as a glucometer and test strips etc. 

strict return precautions were discussed with good verbalized understanding by 

patient and significant other, all questions and concerns addressed prior to ER 

departure





Dragon Disclaimer


Dragon Disclaimer


This electronic medical record was generated, in whole or in part, using a voice

 recognition dictation system.





Departure


Departure:


Impression:  


   Primary Impression:  


   Hyperglycemia due to type 2 diabetes mellitus


Disposition:  01 HOME / SELF CARE / HOMELESS


Condition:  STABLE


Referrals:  


RADHA FLOWERS (PCP)





Additional Instructions:  


You were seen for hyperglycemia.  You need to continue taking your 

insulin/diabetes medications as prescribed and follow up with your primary care 

doctor as soon as possible. It is important for good glycemic control to reduce 

the risks of acute and chronic medical problems.  Please keep a dedicated blood 

sugar log for review with your primary care physician in the outpatient setting.

  Return to the ED if you develop any abdominal pain, vomiting, cough, chest 

pain, fever, or any other new or concerning symptoms.











DORA REARDON DO                 Sep 18, 2021 16:35

## 2021-09-18 NOTE — EKG
[facility] [address], [place] [address]

Test Date:    2021               Test Time:    16:56:34

Pat Name:     ADELFO REDMAN              Department:   

Patient ID:   [id number]           Room:          

Gender:       M                        Technician:   KRYSTAL

:          1970               Requested By: DORA REARDON

Order Number: [id number]            Reading MD:     

                                 Measurements

Intervals                              Axis          

Rate:         81                       P:            56

VT:           138                      QRS:          64

QRSD:         94                       T:            44

QT:           350                                    

QTc:          412                                    

                           Interpretive Statements

SINUS RHYTHM

QRS(T) CONTOUR ABNORMALITY

CONSISTENT WITH ANTEROSEPTAL INFARCT

AGE UNDETERMINED

ABNORMAL ECG

RI6.02

No previous ECG available for comparison

## 2021-10-15 ENCOUNTER — HOSPITAL ENCOUNTER (OUTPATIENT)
Dept: HOSPITAL 61 - CCL | Age: 51
Discharge: HOME | End: 2021-10-15
Attending: INTERNAL MEDICINE
Payer: MEDICARE

## 2021-10-15 VITALS — SYSTOLIC BLOOD PRESSURE: 109 MMHG | DIASTOLIC BLOOD PRESSURE: 70 MMHG

## 2021-10-15 VITALS — DIASTOLIC BLOOD PRESSURE: 67 MMHG | SYSTOLIC BLOOD PRESSURE: 125 MMHG

## 2021-10-15 VITALS — DIASTOLIC BLOOD PRESSURE: 73 MMHG | SYSTOLIC BLOOD PRESSURE: 119 MMHG

## 2021-10-15 VITALS — DIASTOLIC BLOOD PRESSURE: 68 MMHG | SYSTOLIC BLOOD PRESSURE: 118 MMHG

## 2021-10-15 VITALS — SYSTOLIC BLOOD PRESSURE: 108 MMHG | DIASTOLIC BLOOD PRESSURE: 61 MMHG

## 2021-10-15 VITALS — DIASTOLIC BLOOD PRESSURE: 77 MMHG | SYSTOLIC BLOOD PRESSURE: 118 MMHG

## 2021-10-15 VITALS
SYSTOLIC BLOOD PRESSURE: 119 MMHG | DIASTOLIC BLOOD PRESSURE: 73 MMHG | SYSTOLIC BLOOD PRESSURE: 119 MMHG | DIASTOLIC BLOOD PRESSURE: 73 MMHG

## 2021-10-15 VITALS — SYSTOLIC BLOOD PRESSURE: 113 MMHG | DIASTOLIC BLOOD PRESSURE: 68 MMHG

## 2021-10-15 VITALS — SYSTOLIC BLOOD PRESSURE: 119 MMHG | DIASTOLIC BLOOD PRESSURE: 67 MMHG

## 2021-10-15 VITALS — DIASTOLIC BLOOD PRESSURE: 56 MMHG | SYSTOLIC BLOOD PRESSURE: 106 MMHG

## 2021-10-15 VITALS — SYSTOLIC BLOOD PRESSURE: 113 MMHG | DIASTOLIC BLOOD PRESSURE: 77 MMHG

## 2021-10-15 VITALS — DIASTOLIC BLOOD PRESSURE: 76 MMHG | SYSTOLIC BLOOD PRESSURE: 124 MMHG

## 2021-10-15 VITALS — HEIGHT: 73 IN | WEIGHT: 315 LBS | BODY MASS INDEX: 41.75 KG/M2

## 2021-10-15 DIAGNOSIS — Z79.84: ICD-10-CM

## 2021-10-15 DIAGNOSIS — Z98.890: ICD-10-CM

## 2021-10-15 DIAGNOSIS — G47.30: ICD-10-CM

## 2021-10-15 DIAGNOSIS — K21.9: ICD-10-CM

## 2021-10-15 DIAGNOSIS — M19.90: ICD-10-CM

## 2021-10-15 DIAGNOSIS — Z20.822: ICD-10-CM

## 2021-10-15 DIAGNOSIS — E66.9: ICD-10-CM

## 2021-10-15 DIAGNOSIS — F41.9: ICD-10-CM

## 2021-10-15 DIAGNOSIS — Z79.899: ICD-10-CM

## 2021-10-15 DIAGNOSIS — F17.210: ICD-10-CM

## 2021-10-15 DIAGNOSIS — Z79.82: ICD-10-CM

## 2021-10-15 DIAGNOSIS — J43.9: ICD-10-CM

## 2021-10-15 DIAGNOSIS — I10: ICD-10-CM

## 2021-10-15 DIAGNOSIS — E11.9: ICD-10-CM

## 2021-10-15 DIAGNOSIS — Z88.0: ICD-10-CM

## 2021-10-15 DIAGNOSIS — I25.110: ICD-10-CM

## 2021-10-15 DIAGNOSIS — E78.00: ICD-10-CM

## 2021-10-15 DIAGNOSIS — N40.0: ICD-10-CM

## 2021-10-15 DIAGNOSIS — Z88.8: ICD-10-CM

## 2021-10-15 DIAGNOSIS — R06.09: Primary | ICD-10-CM

## 2021-10-15 LAB
ANION GAP SERPL CALC-SCNC: 6 MMOL/L (ref 6–14)
BUN SERPL-MCNC: 12 MG/DL (ref 8–26)
CALCIUM SERPL-MCNC: 8.5 MG/DL (ref 8.5–10.1)
CHLORIDE SERPL-SCNC: 98 MMOL/L (ref 98–107)
CO2 SERPL-SCNC: 32 MMOL/L (ref 21–32)
CREAT SERPL-MCNC: 1.2 MG/DL (ref 0.7–1.3)
ERYTHROCYTE [DISTWIDTH] IN BLOOD BY AUTOMATED COUNT: 14.5 % (ref 11.5–14.5)
GFR SERPLBLD BASED ON 1.73 SQ M-ARVRAT: 64.1 ML/MIN
GLUCOSE SERPL-MCNC: 251 MG/DL (ref 70–99)
HCT VFR BLD CALC: 50.8 % (ref 39–53)
HGB BLD-MCNC: 17.4 G/DL (ref 13–17.5)
MCH RBC QN AUTO: 30 PG (ref 25–35)
MCHC RBC AUTO-ENTMCNC: 34 G/DL (ref 31–37)
MCV RBC AUTO: 87 FL (ref 79–100)
PLATELET # BLD AUTO: 215 X10^3/UL (ref 140–400)
POTASSIUM SERPL-SCNC: 4.4 MMOL/L (ref 3.5–5.1)
PROTHROMBIN TIME: 13.2 SEC (ref 11.7–14)
RBC # BLD AUTO: 5.83 X10^6/UL (ref 4.3–5.7)
SODIUM SERPL-SCNC: 136 MMOL/L (ref 136–145)
WBC # BLD AUTO: 8.3 X10^3/UL (ref 4–11)

## 2021-10-15 PROCEDURE — 85027 COMPLETE CBC AUTOMATED: CPT

## 2021-10-15 PROCEDURE — 85610 PROTHROMBIN TIME: CPT

## 2021-10-15 PROCEDURE — 36415 COLL VENOUS BLD VENIPUNCTURE: CPT

## 2021-10-15 PROCEDURE — C1773 RET DEV, INSERTABLE: HCPCS

## 2021-10-15 PROCEDURE — 99152 MOD SED SAME PHYS/QHP 5/>YRS: CPT

## 2021-10-15 PROCEDURE — 87426 SARSCOV CORONAVIRUS AG IA: CPT

## 2021-10-15 PROCEDURE — 93460 R&L HRT ART/VENTRICLE ANGIO: CPT

## 2021-10-15 PROCEDURE — 99153 MOD SED SAME PHYS/QHP EA: CPT

## 2021-10-15 PROCEDURE — 80048 BASIC METABOLIC PNL TOTAL CA: CPT

## 2021-10-15 PROCEDURE — G0269 OCCLUSIVE DEVICE IN VEIN ART: HCPCS

## 2021-10-15 PROCEDURE — C1894 INTRO/SHEATH, NON-LASER: HCPCS

## 2021-10-15 NOTE — NUR
Discharge Note:



ADELFO REDMAN



Discharge instructions and discharge home medications reviewed with Patient and a copy 
given. All questions have been answered and understanding verbalized. 



The following instructions and handouts were given: groin site care,adult moderate sedation, 
smoking cessation



Discontinued lines and drains: Peripheral IV intact.



Patient discharged to Home or Self Care withSpousevia Wheelchair

## 2021-10-15 NOTE — PDOC
MODERATE SEDATION ASSESSMENT


RISKS/ALTERNATIVES


Risks/Alternatives


Risks and alternatives of this type of sedation and procedure discussed with:


RISK/ALTERNATIVES:  Patient





H & P ON CHART


H & P


H & P on chart and reviewed for co-morbid conditions and appropriate labs.


H&P ON CHART:  Yes





PREGNANCY STATUS


PREG STATUS ASSESSED:  N/A





MEDS/ALLERGIES REVIEWED


Meds/Allergies Reviewed


Medications and Allergies including time and route of recently administered 

narcotics and sedatives.


MEDS/ALLERGIES REVIEWED:  Yes





ASA RATING


ASA RATING:  II





AIRWAY ASSESSMENT


Airway Assessment


Airway patency, oral function limitations, presence  of caps, crowns, dentures, 

partials, and ability to extend neck assessed.


AIRWAY ASSESSMENT:  Yes





MALLAMPATI SCORE


MALLAMPATI SCORE:  II





PRE-SEDATION ASSESSMENT


PRE-SEDATION ASSESSMENT:  Yes











SHAHBAZ HAND MD           Oct 15, 2021 10:32

## 2021-10-15 NOTE — CARD
MR#: [id number]

Account#: [id number]

Accession#: [id number]

Date of Study: 10/15/2021

Ordering Physician: SHAHBAZ HAND, 

Referring Physician: SHAHBZA HAND 

Tech: RT Darshan(R)





--------------- APPROVED REPORT --------------





Technologist: Annamaria Michelle RT(R)

Nurse: Vero Hernandez RN



Procedure(s) performed: Right and left heart catheterization, selective coronary angiography and left
 ventriculography



MODERATE SEDATION TIME: 45 MIN

FLUORO TIME: 5.0 MIN

DOSE: 71 GYCM2

CONTRAST: 123CC VISI



INDICATION

The indication(s) include : Refractory dyspnea on exertion and chest pain with typical features radha
rning for unstable angina.



[facility] Clinical Frailty Scale

[facility] Clinical Frailty Scale: Mildly Frail



Heart Failure

Heart Failure: Yes

If Yes, Newly Diagnosed: No

If Yes, HF Type: Diastolic     

If Yes, NYHA Class: Class II     



CASE TECHNIQUE

IV conscious sedation was used throughout procedure with appropriate monitoring and was performed in 
the presence of a registered nurse who was an independent trained observer other than the physician p
erforming the procedure. During this case, Fluoroscopy and low osmolar contrast were used for imaging
. Specimen(s) Removed: No Estimated Blood loss: 20 cc's.



PROCEDURE NARRATIVE

After explaining the risk, benefits and alternative options, informed consent was obtained from patie
nt.  Patient was brought to the cardiac Cath Lab and his right groin was prepped and draped in the us
ual fashion.  20 cc of 2% lidocaine was infiltrated into the skin and subcutaneous tissues for local 
anesthesia.  Arterial and venous accesses were obtained in the right common femoral artery and vein r
espectively and 6 and 8 [place] sheaths inserted.  A 7.5 [place] catheter was then advanced u
nder fluoroscopic guidance and intracardiac pressures, oxygen saturations and cardiac output by Kim 
method measured.  Subsequently, 6 [place] JL4 and 6 [place] JR4 catheters were used to perform selectiv
e angiography of the left and right coronary arteries.  6 [place] pigtail catheter was used to perform
 left ventriculography.  Hemostasis in the right groin was achieved using Angio-Seal.  Patient tolera
agustin the procedure well.  There were no immediate complications.



FINDINGS

A.  RIGHT HEART CATHETERIZATION

1.  Intracardiac pressures: Mean right atrial pressure 8 mmHg, right ventricular pressure 31/1 mmHg, 
pulmonary artery pressure 28/9 mmHg with mean PA pressure of 18 mmHg and mean pulmonary capillary wed
ge pressure 16 mmHg.  No pulmonary hypertension noted.

2.  Oxygen saturations: Right atrium 75.9%, pulmonary artery 74.5%, femoral arterial sheath 95.3%.  N
o evidence of intracardiac shunt.

3.  Cardiac output by Kim method 6.5 L/min.



B.  LEFT HEART CATHETERIZATION

1.  Hemodynamics: Left ventricular end-diastolic pressure 12 mmHg.  No pullback gradient across the a
ortic valve.

2.  Left ventriculography: Normal left ventricle systolic function with ejection fraction estimated a
t 60%.  No significant mitral regurgitation seen.

3.  Coronary angiography:

a.  The left main coronary artery arose from the left sinus of Valsalva, gave rise to the left anteri
or descending and left circumflex arteries and did not show any significant stenosis.

b.  The left anterior descending artery did not show any significant stenosis.

c.  The left circumflex artery did not show any significant stenosis.

d.  The right coronary artery was a large and dominant vessel arising from the right sinus of Valsalv
a that did not show any significant stenosis. 



Conclusion

1.  No significant coronary artery disease

2.  Normal left ventricular systolic function with ejection fraction estimated at 60%

3.  No pulmonary hypertension or intracardiac shunt



Recommendations

Regular exercise regimen and weight loss



Signed by : Shahbaz Hand, 

Electronically Approved : 10/15/2021 10:48:09

## 2021-12-08 ENCOUNTER — HOSPITAL ENCOUNTER (EMERGENCY)
Dept: HOSPITAL 63 - ER | Age: 51
Discharge: HOME | End: 2021-12-08
Payer: MEDICARE

## 2021-12-08 VITALS — DIASTOLIC BLOOD PRESSURE: 70 MMHG | SYSTOLIC BLOOD PRESSURE: 137 MMHG

## 2021-12-08 VITALS — HEIGHT: 73 IN | WEIGHT: 315 LBS | BODY MASS INDEX: 41.75 KG/M2

## 2021-12-08 DIAGNOSIS — K21.9: ICD-10-CM

## 2021-12-08 DIAGNOSIS — F17.210: ICD-10-CM

## 2021-12-08 DIAGNOSIS — E78.00: ICD-10-CM

## 2021-12-08 DIAGNOSIS — L02.416: Primary | ICD-10-CM

## 2021-12-08 DIAGNOSIS — Z88.0: ICD-10-CM

## 2021-12-08 PROCEDURE — 99283 EMERGENCY DEPT VISIT LOW MDM: CPT

## 2021-12-08 PROCEDURE — 10060 I&D ABSCESS SIMPLE/SINGLE: CPT

## 2021-12-08 NOTE — PHYS DOC
Past History


Past Medical History:  Cancer, GERD, High Cholesterol, Heart Disease


Additional Past Medical Histor:  back pain


 (MAHESH GRIJALVA)


Past Surgical History:  Angioplasty, Cancer Surgery, Knee Replacement


 (MAHESH GRIJALVA)


Smoking:  Cigarettes


Alcohol Use:  Occasionally


Drug Use:  None


 (MAHESH GRIJALVA)





General Adult


EDM:


Chief Complaint:  ABSCESS





HPI:


HPI:





Patient is a 51-year-old male that presents today with an abscess to his left 

inner thigh.  Patient states that he saw his primary care physician a couple 

days ago for the possibility of a cellulitic area in his inner thigh a couple 

days ago was started on some doxycycline at that time he says that over the last

24 hours it has become more painful and has grown.  Patient states last night he

got into the bathtub and stuck a needle in it and he said he got a large amount 

of "stuff out of it".  Patient states that he was up most of the night due to 

increased pain and presents today for further drainage of this abscess.  Patient

denies fever and chills.


 (MAHESH GRIJALVA)





Review of Systems:


Review of Systems:


Constitutional:  Denies fever or chills 


Eyes:  Denies change in visual acuity 


HENT:  Denies nasal congestion or sore throat 


Respiratory:  Denies cough or shortness of breath 


Cardiovascular:  Denies chest pain or edema 


GI:  Denies abdominal pain, nausea, vomiting, bloody stools or diarrhea 


: Denies dysuria 


Musculoskeletal: Left inner thigh wound with drainage


Integument:  Denies rash 


Neurologic:  Denies headache, focal weakness or sensory changes 


Endocrine:  Denies polyuria or polydipsia 


Lymphatic:  Denies swollen glands 


Psychiatric:  Denies depression or anxiety


 (MAHESH GRIJALVA)





Current Medications:


Current Meds:





Current Medications








 Medications


  (Trade)  Dose


 Ordered  Sig/Sylvester  Start Time


 Stop Time Status Last Admin


Dose Admin


 


 Acetaminophen/


 Hydrocodone Bitart


  (Lortab 5/325)  2 tab  1X  ONCE  12/8/21 10:45


 12/8/21 10:52 DC 12/8/21 11:05


2 TAB


 


 Lidocaine/


 Epinephrine


  (Let


  (Lido-Epineph-Tetra)


 Gel)  3 ml  1X  ONCE  12/8/21 10:45


 12/8/21 10:52 DC 12/8/21 11:04


3 ML


 


 Lidocaine/


 Epinephrine


  (Xylocaine


 1%-Epi 1:100,000)  20 ml  1X  ONCE  12/8/21 10:45


 12/8/21 10:52 DC 12/8/21 11:04


20 ML








 (MAHESH GRIJALVA)





Allergies:


Allergies:





Allergies








Coded Allergies Type Severity Reaction Last Updated Verified


 


  Penicillins Allergy Severe anaphylaxis 11/11/20 Yes








 (MAHESH GRIJALVA)





Physical Exam:


PE:





Constitutional: Well developed, well nourished, no acute distress, non-toxic 

appearance. []


HENT: Normocephalic, atraumatic, bilateral external ears normal, oropharynx 

moist, no oral exudates, nose normal. []


Eyes: PERRLA, EOMI, conjunctiva normal, no discharge. [] 


Neck: Normal range of motion, no tenderness, supple, no stridor. [] 


Cardiovascular:Heart rate regular rhythm, no murmur []


Lungs & Thorax:  Bilateral breath sounds clear to auscultation []


Abdomen: Bowel sounds normal, soft, no tenderness, no masses, no pulsatile 

masses. [] 


Skin: Warm, dry, no erythema, no rash. [] 


Back: No tenderness, no CVA tenderness. [] 


Extremities: Left inner thigh a 5 cm x 3 cm reddened raised area that has scant 

amount of drainage, area is painful to touch, no streaking noted in the leg, no 

diminished range of motion. 


Neurologic: Alert and oriented X 3, normal motor function, normal sensory 

function, no focal deficits noted. []


Psychologic: Affect normal, judgement normal, mood normal. []


 (MAHESH GRIJALVA)





Current Patient Data:


Vital Signs:





                                   Vital Signs








  Date Time  Temp Pulse Resp B/P (MAP) Pulse Ox O2 Delivery O2 Flow Rate FiO2


 


12/8/21 11:05   20  95   


 


12/8/21 10:20 98.2 90  137/70 (92)    








 (MAHESH GRIJALVA)





EKG:


EKG:


[]


 (MAHESH GRIJALVA)





Radiology/Procedures:


Radiology/Procedures:


Indication: Left inner thigh abscess





Procedure: The patient was positioned appropriately and the skin over the 

incision site was cleansed with Betadine solution, lidocaine 1% with epinephrine

 8 mL was instilled into the area. An incision was then made over the left inner

 thigh abscess, area was explored no inoculations no drainage was noted.  Area 

was irrigated with 120 mL of normal saline.  Dressing applied by nursing staff. 

 Tetanus status is up-to-date





The patient tolerated the procedure well





Complications: No complications


 (MAHESH GRIJALVA APRN)





Heart Score:


C/O Chest Pain:  N/A


Risk Factors:


Risk Factors:  DM, Current or recent (<one month) smoker, HTN, HLP, family 

history of CAD, obesity.


Risk Scores:


Score 0 - 3:  2.5% MACE over next 6 weeks - Discharge Home


Score 4 - 6:  20.3% MACE over next 6 weeks - Admit for Clinical Observation


Score 7 - 10:  72.7% MACE over next 6 weeks - Early Invasive Strategies


 (MAHESH GRIJALVA)





Course & Med Decision Making:


Course & Med Decision Making


Pertinent Labs and Imaging studies reviewed. (See chart for details)





Patient was instructed to continue the doxycycline, will add Bactrim DS to his 

regimen.  She is instructed to wash the area with antibacterial soap, watch for 

any increased redness or warmth in the area, patient is instructed to follow-up 

with his primary care physician early next week, patient is to return to the 

emergency department for fever, inability to keep by mouth fluids down, 

increased pain, swelling, or redness and warmth in that area.


 (MAHESH GRIJALVA APRN)





Dragon Disclaimer:


Dragon Disclaimer:


This electronic medical record was generated, in whole or in part, using a voice

 recognition dictation system.


 (MAHESH GRIJALVA APRN)





Departure


Departure:


Impression:  


   Primary Impression:  


   Abscess of left thigh


Disposition:  01 HOME / SELF CARE / HOMELESS


Condition:  STABLE


Referrals:  


RADHA FLOWERS (PCP)


Patient Instructions:  Abscess





Additional Instructions:  


Continue with doxycycline


Bactrim DS twice daily for 7 full days


Clean wound 1-2 times daily with antibacterial soap, watching for any signs of 

increased swelling, redness, or warmth at the area


Return to the emergency department for increased pain, swelling, redness, 

warmth, or systemic fever


Follow-up with your primary care physician next week


Scripts


Sulfamethoxazole/Trimethoprim (BACTRIM DS TABLET) 1 Each Tablet


1 TAB PO BID for abscess for 7 Days, #14 TAB 0 Refills


   Prov: MAHESH GRIJALVA APRN         12/8/21





Attending Signature


Attending Signature


I have reviewed the PA/NP's note and plan of care. I was available for 

consultation as needed during the patient's visit in the emergency department. I

 agree with the clinical impression, plan, and disposition.


 (YANIQUE HENRIQUEZ DO)











MAHESH GRIJALVA APRN        Dec 8, 2021 12:33


YANIQUE HENRIQUEZ DO              Dec 9, 2021 01:12

## 2021-12-17 ENCOUNTER — HOSPITAL ENCOUNTER (INPATIENT)
Dept: HOSPITAL 63 - ER | Age: 51
Discharge: LEFT BEFORE DISCHARGE | DRG: 727 | End: 2021-12-17
Attending: INTERNAL MEDICINE | Admitting: INTERNAL MEDICINE
Payer: MEDICARE

## 2021-12-17 VITALS — BODY MASS INDEX: 41.75 KG/M2 | WEIGHT: 315 LBS | HEIGHT: 73 IN

## 2021-12-17 VITALS
SYSTOLIC BLOOD PRESSURE: 143 MMHG | SYSTOLIC BLOOD PRESSURE: 143 MMHG | DIASTOLIC BLOOD PRESSURE: 84 MMHG | DIASTOLIC BLOOD PRESSURE: 84 MMHG

## 2021-12-17 VITALS — SYSTOLIC BLOOD PRESSURE: 139 MMHG | DIASTOLIC BLOOD PRESSURE: 82 MMHG

## 2021-12-17 DIAGNOSIS — Z96.659: ICD-10-CM

## 2021-12-17 DIAGNOSIS — E78.00: ICD-10-CM

## 2021-12-17 DIAGNOSIS — G47.33: ICD-10-CM

## 2021-12-17 DIAGNOSIS — E11.65: ICD-10-CM

## 2021-12-17 DIAGNOSIS — U07.1: ICD-10-CM

## 2021-12-17 DIAGNOSIS — Z86.73: ICD-10-CM

## 2021-12-17 DIAGNOSIS — F17.210: ICD-10-CM

## 2021-12-17 DIAGNOSIS — E78.5: ICD-10-CM

## 2021-12-17 DIAGNOSIS — N49.2: Primary | ICD-10-CM

## 2021-12-17 DIAGNOSIS — Z88.0: ICD-10-CM

## 2021-12-17 DIAGNOSIS — Z53.29: ICD-10-CM

## 2021-12-17 DIAGNOSIS — K21.9: ICD-10-CM

## 2021-12-17 DIAGNOSIS — Z80.1: ICD-10-CM

## 2021-12-17 DIAGNOSIS — I25.10: ICD-10-CM

## 2021-12-17 DIAGNOSIS — I10: ICD-10-CM

## 2021-12-17 DIAGNOSIS — J44.9: ICD-10-CM

## 2021-12-17 DIAGNOSIS — E66.01: ICD-10-CM

## 2021-12-17 DIAGNOSIS — M19.90: ICD-10-CM

## 2021-12-17 LAB
ALBUMIN SERPL-MCNC: 3.5 G/DL (ref 3.4–5)
ALBUMIN/GLOB SERPL: 1 {RATIO} (ref 1–1.7)
ALP SERPL-CCNC: 88 U/L (ref 46–116)
ALT SERPL-CCNC: 32 U/L (ref 16–63)
ANION GAP SERPL CALC-SCNC: 11 MMOL/L (ref 6–14)
AST SERPL-CCNC: 18 U/L (ref 15–37)
BASOPHILS # BLD AUTO: 0 X10^3/UL (ref 0–0.2)
BASOPHILS NFR BLD: 1 % (ref 0–3)
BILIRUB SERPL-MCNC: 0.8 MG/DL (ref 0.2–1)
BUN/CREAT SERPL: 8 (ref 6–20)
CA-I SERPL ISE-MCNC: 11 MG/DL (ref 8–26)
CALCIUM SERPL-MCNC: 8.5 MG/DL (ref 8.5–10.1)
CHLORIDE SERPL-SCNC: 95 MMOL/L (ref 98–107)
CO2 SERPL-SCNC: 27 MMOL/L (ref 21–32)
CREAT SERPL-MCNC: 1.3 MG/DL (ref 0.7–1.3)
EOSINOPHIL NFR BLD: 0.1 X10^3/UL (ref 0–0.7)
EOSINOPHIL NFR BLD: 1 % (ref 0–3)
ERYTHROCYTE [DISTWIDTH] IN BLOOD BY AUTOMATED COUNT: 14.2 % (ref 11.5–14.5)
GFR SERPLBLD BASED ON 1.73 SQ M-ARVRAT: 58.4 ML/MIN
GLOBULIN SER-MCNC: 3.5 G/DL (ref 2.2–3.8)
GLUCOSE SERPL-MCNC: 389 MG/DL (ref 70–99)
HCT VFR BLD CALC: 49.9 % (ref 39–53)
HGB BLD-MCNC: 17.1 G/DL (ref 13–17.5)
LYMPHOCYTES # BLD: 1 X10^3/UL (ref 1–4.8)
LYMPHOCYTES NFR BLD AUTO: 11 % (ref 24–48)
MCH RBC QN AUTO: 30 PG (ref 25–35)
MCHC RBC AUTO-ENTMCNC: 34 G/DL (ref 31–37)
MCV RBC AUTO: 87 FL (ref 79–100)
MONO #: 0.4 X10^3/UL (ref 0–1.1)
MONOCYTES NFR BLD: 4 % (ref 0–9)
NEUT #: 7.4 X10^3UL (ref 1.8–7.7)
NEUTROPHILS NFR BLD AUTO: 83 % (ref 31–73)
PLATELET # BLD AUTO: 185 X10^3/UL (ref 140–400)
POTASSIUM SERPL-SCNC: 4.2 MMOL/L (ref 3.5–5.1)
PROT SERPL-MCNC: 7 G/DL (ref 6.4–8.2)
RBC # BLD AUTO: 5.71 X10^6/UL (ref 4.3–5.7)
SODIUM SERPL-SCNC: 133 MMOL/L (ref 136–145)
WBC # BLD AUTO: 8.9 X10^3/UL (ref 4–11)

## 2021-12-17 PROCEDURE — 36415 COLL VENOUS BLD VENIPUNCTURE: CPT

## 2021-12-17 PROCEDURE — 87426 SARSCOV CORONAVIRUS AG IA: CPT

## 2021-12-17 PROCEDURE — 80053 COMPREHEN METABOLIC PANEL: CPT

## 2021-12-17 PROCEDURE — U0003 INFECTIOUS AGENT DETECTION BY NUCLEIC ACID (DNA OR RNA); SEVERE ACUTE RESPIRATORY SYNDROME CORONAVIRUS 2 (SARS-COV-2) (CORONAVIRUS DISEASE [COVID-19]), AMPLIFIED PROBE TECHNIQUE, MAKING USE OF HIGH THROUGHPUT TECHNOLOGIES AS DESCRIBED BY CMS-2020-01-R: HCPCS

## 2021-12-17 PROCEDURE — 76870 US EXAM SCROTUM: CPT

## 2021-12-17 PROCEDURE — 96374 THER/PROPH/DIAG INJ IV PUSH: CPT

## 2021-12-17 PROCEDURE — 96375 TX/PRO/DX INJ NEW DRUG ADDON: CPT

## 2021-12-17 PROCEDURE — 83605 ASSAY OF LACTIC ACID: CPT

## 2021-12-17 PROCEDURE — 96361 HYDRATE IV INFUSION ADD-ON: CPT

## 2021-12-17 PROCEDURE — 74177 CT ABD & PELVIS W/CONTRAST: CPT

## 2021-12-17 PROCEDURE — 82947 ASSAY GLUCOSE BLOOD QUANT: CPT

## 2021-12-17 PROCEDURE — 87040 BLOOD CULTURE FOR BACTERIA: CPT

## 2021-12-17 PROCEDURE — 85025 COMPLETE CBC W/AUTO DIFF WBC: CPT

## 2021-12-17 NOTE — RAD
CLINICAL HISTORY: Reason: swelling left scrotum 



COMPARISON: None available.



TECHNIQUE:  Ultrasound images of the scrotum was performed with gray-scale and color doppler.



FINDINGS:

The right testis measures 3.9 x 2.6 x 2.9. 



The left testis measures 3.4 x 2.9 x 2.1.



 There is no intratesticular abnormality. Testicular vascularity is symmetric and within normal limit
s.



There is a 9 mm simple cyst in right epididymis and 1 cm simple cyst in the left epididymis. Bilatera
l small hydrocele is seen. No varicocele. Doppler evaluation demonstrates bilateral arterial waveform
s in the testicles with the symmetric color Doppler over the testicles noted.



IMPRESSION: 

Bilateral small hydrocele.



Electronically signed by: Braden Coley MD (12/17/2021 1:34 PM) [id number]

## 2021-12-17 NOTE — RAD
Site ID: [id number]



EXAMINATION: CT ABDOMEN+PELVIS W.



Technique: Axial images with coronal and sagittal reconstructions are performed of abdomen and pelvis
 with intravenous contrast.  100  mL of Isovue-300  was administered intravenously.



One or more of the following radiation dose reduction techniques was used: automated exposure control
, adjustment of mA and/or KV according to patient size, and/or utilization of iterative reconstructio
n technique.





HISTORY: 51 years  Male  Reason: testicle abscess. .



COMPARISON:  February 26, 2021.



FINDINGS:

The lung bases are clear.



The liver demonstrates hypoattenuation compatible with fatty infiltration with no focal mass identifi
ed. The gallbladder demonstrates no calcified stones. The pancreas and the adrenal glands appear unre
markable. Spleen is mildly to moderately enlarged measuring in AP dimension 18.8 cm.



The kidneys demonstrate no hydronephrosis. Again demonstrated the hypodense lesions in the lower pole
 of the right kidney measuring up to 2.5 cm in size with the low attenuation suggestive of cysts. The
re is slight heterogeneity in the inferior lesion which could be related to internal debris. To rule 
out a solid component, renal ultrasound is recommended.



The abdominal aorta is normal in caliber. No para-aortic significantly enlarged lymph node is seen.



There is a no significant free fluid or fluid collection in the abdomen or pelvis. The appendix is no
rmal. No bowel obstruction.



The urinary bladder appear unremarkable. There is prominent fat content within the inguinal canals wh
ich could the be related to small fat-containing hernias.



Scanning through the area of the scrotum demonstrate edema around the testicle more on the left side 
with no definite abscess. The osseous structures demonstrate the mild degenerative changes.



IMPRESSION:

1. Edema around left testicle with no abscess identified. Sclerae ultrasound would be helpful if ther
e is concern about the testicular vascularity.

2. Hepatic steatosis.

3. Mild to moderate splenomegaly.

4. Bilateral small fat-containing inguinal hernias.

5. Hypodense heterogeneous lesion in the lower pole of the left kidney. Renal ultrasound follow-up is
 recommended to rule out a solid component.



Electronically signed by: Braden Coley MD (12/17/2021 11:47 AM) [id number]

## 2021-12-17 NOTE — NUR
Discharge note 

Pt left AMA at 2022 Pt was given information about his COVID + Test result and COVID 
discharge instructions. Pt was educated on Risks of leaving AMA however pt insisted on 
Leaving against medical advice. Pt was escorted out of building

## 2021-12-17 NOTE — NUR
Pharmacy Vancomycin Dosing Note



S:Consulted to monitor and dose vancomycin started 12/17/21.



O:ADELFO REDMAN is a 51 year old M with 

Cellulitis, .



Height: 6 feet, 1 inches

Weight: 145.4 kg

Ideal Body Weight: 79.90 

Adjusted Body Weight: 106.10 

Dosing Weight: Actual



Other Antibiotics: 

MERREM 1GM IV Q8HR



LABS:

Last BUN: 11 

Last Creatinine: 1.3 

Creatinine Clearance: 102.02 

Last WBC: 8.9 





Vancomycin Dosing:

Loading Dose: 2000 mg x1

Dosing Weight: Actual

Target Trough: 10-20





A: Based on: Actual weight, reanl function, and indication





P: 1. Begin Vancomycin 2000 mg IV q12h

   2. Follow up Trough level on 12/18/21 at 2230 

   3. Pharmacy will continue to monitor, follow and adjust therapy as needed.

 

 CHIVO QUAN,  12/17/21 [id number]

## 2021-12-17 NOTE — PHYS DOC
Past History


Past Medical History:  Cancer, GERD, High Cholesterol, Heart Disease


Additional Past Medical Histor:  back pain


Past Surgical History:  Angioplasty, Cancer Surgery, Knee Replacement


Smoking:  Cigarettes


Alcohol Use:  None


Drug Use:  None





General Adult


EDM:


Chief Complaint:  Swollen scrotum





HPI:


HPI:


51-year-old male presents with swollen left scrotum.  The patient states that 

this started off with a M&M size area of redness that has expanded significantly

over the last several days.  He now has extremely tender left groin and left sc

rotum.  It is very sensitive to the touch.  It is erythematous.  The patient is 

on 2 different oral antibiotics from his primary physician for cellulitis.  

Patient denies fever or chills.  He has no other specific complaints at this 

time.





Review of Systems:


Review of Systems:


Constitutional:  Denies fever or chills 


Eyes:  Denies change in visual acuity 


HENT:  Denies nasal congestion or sore throat 


Respiratory:  Denies cough or shortness of breath 


Cardiovascular:  Denies chest pain or edema 


GI:  Denies abdominal pain, nausea, vomiting, bloody stools or diarrhea 


: Swollen and painful left scrotum


Musculoskeletal:  Denies back pain or joint pain 


Integument: Cellulitis left scrotum


Neurologic:  Denies headache, focal weakness or sensory changes 


Endocrine:  Denies polyuria or polydipsia 


Lymphatic:  Denies swollen glands 


Psychiatric:  Denies depression or anxiety





Current Medications:


Current Meds:





Current Medications








 Medications


  (Trade)  Dose


 Ordered  Sig/Sylvester  Start Time


 Stop Time Status Last Admin


Dose Admin


 


 Cefazolin Sodium/


 Dextrose  50 ml @ 


 100 mls/hr  1X  ONCE  12/17/21 12:15


 12/17/21 12:44 Cancel  





 


 Hydromorphone HCl


  (Dilaudid)  1 mg  1X  ONCE  12/17/21 12:15


 12/17/21 12:16 DC  





 


 Iohexol


  (Omnipaque 300


 Mg/ml)  75 ml  1X  ONCE  12/17/21 10:30


 12/17/21 10:31 DC 12/17/21 10:45


75 ML


 


 Ondansetron HCl


  (Zofran)  4 mg  1X  ONCE  12/17/21 11:15


 12/17/21 11:16 DC 12/17/21 11:13


4 MG


 


 Sodium Chloride  500 ml @ 


 As Directed  STK-MED ONCE  12/17/21 12:26


 12/17/21 12:26 DC  





 


 Vancomycin HCl


  (Vancomycin)  1 gm  STK-MED ONCE  12/17/21 12:26


 12/17/21 12:26 DC  





 


 Vancomycin HCl 2


 gm/Sodium Chloride  500 ml @ 


 250 mls/hr  1X  ONCE  12/17/21 12:15


 12/17/21 14:14   














Allergies:


Allergies:





Allergies








Coded Allergies Type Severity Reaction Last Updated Verified


 


  Penicillins Allergy Severe anaphylaxis 11/11/20 Yes











Physical Exam:


PE:





Constitutional: Well developed, well nourished, morbidly obese, in pain, non-

toxic appearance. []


HENT: Normocephalic, atraumatic, bilateral external ears normal, oropharynx 

moist, no oral exudates, nose normal. []


Eyes: PERRLA, EOMI, conjunctiva normal, no discharge. [] 


Neck: Normal range of motion, no tenderness, supple, no stridor. [] 


Cardiovascular: Heart rate regular rhythm, no murmur []


Lungs & Thorax:  Bilateral breath sounds clear to auscultation []


Abdomen: Bowel sounds normal, soft, no tenderness, no masses, no pulsatile 

masses. [] 


Skin: Cellulitis of the left scrotum with swelling and tenderness.  Unable to 

fully assess for abscess due to patient's pain with palpation.  [] 


Back: No tenderness, no CVA tenderness. [] 


Extremities: No tenderness, no cyanosis, no clubbing, ROM intact, no edema. [] 


Neurologic: Alert and oriented X 3, normal motor function, normal sensory 

function, no focal deficits noted. []


Psychologic: Affect normal, judgement normal, mood anxious. []





Current Patient Data:


Labs:





                                Laboratory Tests








Test


 12/17/21


10:38 12/17/21


10:39


 


White Blood Count


 8.9 x10^3/uL


(4.0-11.0) 





 


Red Blood Count


 5.71 x10^6/uL


(4.30-5.70)  H 





 


Hemoglobin


 17.1 g/dL


(13.0-17.5) 





 


Hematocrit


 49.9 %


(39.0-53.0) 





 


Mean Corpuscular Volume


 87 fL ()


 





 


Mean Corpuscular Hemoglobin 30 pg (25-35)   


 


Mean Corpuscular Hemoglobin


Concent 34 g/dL


(31-37) 





 


Red Cell Distribution Width


 14.2 %


(11.5-14.5) 





 


Platelet Count


 185 x10^3/uL


(140-400) 





 


Neutrophils (%) (Auto) 83 % (31-73)  H 


 


Lymphocytes (%) (Auto) 11 % (24-48)  L 


 


Monocytes (%) (Auto) 4 % (0-9)   


 


Eosinophils (%) (Auto) 1 % (0-3)   


 


Basophils (%) (Auto) 1 % (0-3)   


 


Neutrophils # (Auto)


 7.4 x10^3uL


(1.8-7.7) 





 


Lymphocytes # (Auto)


 1.0 x10^3/uL


(1.0-4.8) 





 


Monocytes # (Auto)


 0.4 x10^3/uL


(0.0-1.1) 





 


Eosinophils # (Auto)


 0.1 x10^3/uL


(0.0-0.7) 





 


Basophils # (Auto)


 0.0 x10^3/uL


(0.0-0.2) 





 


Sodium Level


 133 mmol/L


(136-145)  L 





 


Potassium Level


 4.2 mmol/L


(3.5-5.1) 





 


Chloride Level


 95 mmol/L


()  L 





 


Carbon Dioxide Level


 27 mmol/L


(21-32) 





 


Anion Gap 11 (6-14)   


 


Blood Urea Nitrogen


 11 mg/dL


(8-26) 





 


Creatinine


 1.3 mg/dL


(0.7-1.3) 





 


Estimated GFR


(Cockcroft-Gault) 58.4  


 





 


BUN/Creatinine Ratio 8 (6-20)   


 


Glucose Level


 389 mg/dL


(70-99)  H 





 


Lactic Acid Level


 2.6 mmol/L


(0.4-2.0)  H 





 


Calcium Level


 8.5 mg/dL


(8.5-10.1) 





 


Total Bilirubin


 0.8 mg/dL


(0.2-1.0) 





 


Aspartate Amino Transferase


(AST) 18 U/L (15-37)


 





 


Alanine Aminotransferase (ALT)


 32 U/L (16-63)


 





 


Alkaline Phosphatase


 88 U/L


() 





 


Total Protein


 7.0 g/dL


(6.4-8.2) 





 


Albumin


 3.5 g/dL


(3.4-5.0) 





 


Albumin/Globulin Ratio 1.0 (1.0-1.7)   


 


SARS-CoV-2 Antigen (Rapid)


 


 Negative


(NEGATIVE)








Vital Signs:





                                   Vital Signs








  Date Time  Temp Pulse Resp B/P (MAP) Pulse Ox O2 Delivery O2 Flow Rate FiO2


 


12/17/21 11:30     94   


 


12/17/21 11:27      Nasal Cannula 2.0 


 


12/17/21 10:10  85 18 140/71 (94)    


 


12/17/21 09:38 98.6       











EKG:


EKG:


[]





Radiology/Procedures:


Radiology/Procedures:


[]


Impressions:


CLINICAL HISTORY: Reason: swelling left scrotum 





COMPARISON: None available.





TECHNIQUE:  Ultrasound images of the scrotum was performed with gray-scale and 

color doppler.





FINDINGS:


The right testis measures 3.9 x 2.6 x 2.9. 





The left testis measures 3.4 x 2.9 x 2.1.





 There is no intratesticular abnormality. Testicular vascularity is symmetric 

and within normal limits.





There is a 9 mm simple cyst in right epididymis and 1 cm simple cyst in the left

 epididymis. Bilateral small hydrocele is seen. No varicocele. Doppler 

evaluation demonstrates bilateral arterial waveforms in the testicles with the 

symmetric color Doppler over the testicles noted.





IMPRESSION: 


Bilateral small hydrocele.





Electronically signed by: Emili Coley MD (12/17/2021 1:34 PM) [id number]














DICTATED AND SIGNED BY:     EMILI COLEY MD


DATE:     12/17/21 1331





CC: EREN MILLER DO; RADHA FLOWERS PA ~MTH0 0





Heart Score:


C/O Chest Pain:  N/A


Risk Factors:


Risk Factors:  DM, Current or recent (<one month) smoker, HTN, HLP, family 

history of CAD, obesity.


Risk Scores:


Score 0 - 3:  2.5% MACE over next 6 weeks - Discharge Home


Score 4 - 6:  20.3% MACE over next 6 weeks - Admit for Clinical Observation


Score 7 - 10:  72.7% MACE over next 6 weeks - Early Invasive Strategies





Course & Med Decision Making:


Course & Med Decision Making


Pertinent Labs and Imaging studies reviewed. (See chart for details)


The patient's labs are unremarkable.  His CT scan does show evidence of 

infection without focal abscess of the left scrotum and testicle area.  I have 

ordered an ultrasound verify blood flow and secondarily rule out abscess.  The 

patient appears to have failed outpatient treatment.  I will admit him to the 

ospital for IV antibiotics.  I will start him on vancomycin.  The patient's 

penicillin allergy was over a decade ago.  He did have tongue swelling at that 

time.  No known reaction to cephalosporins or other beta-lactam's.  The 

patient's ultrasound shows small bilateral hydroceles had no evidence of 

torsion.  See official read for details.  I will admit the patient to the 

hospital for IV antibiotics.  I spoke with Dr. Zuñiga and he has accepted patient 

for admission.


[]





Dragon Disclaimer:


Dragon Disclaimer:


This electronic medical record was generated, in whole or in part, using a voice

 recognition dictation system.





Departure


Departure:


Impression:  


   Primary Impression:  


   Cellulitis of scrotum


Disposition:  09 ADMITTED AS INPATIENT


Admitting Physician:  Tana Zuñiga


Condition:  STABLE


Referrals:  


RADHA FLOWERS (PCP)











EREN MILLER DO                 Dec 17, 2021 12:35

## 2021-12-17 NOTE — HP
DATE OF SERVICE: 2021

ADMIT DATE: 2021

HISTORY OF PRESENT ILLNESS:  The patient is a 51-year-old  male patient

who came to the Emergency Room with painful, red and swollen left scrotum.  He 

stated that he was seen by his primary care physician, Kulwant Lewis, and was 

started on doxycycline; however, he developed what looked like a boil, that was 

incised and drained on 2021 at the Emergency Room and in fact, the abscess

was in the left thigh area and he was discharged home on a course of Bactrim-DS.

However, he apparently developed what seems to be a small area of redness that 

expanded significantly over the last several days despite treatment with 2 

antibiotics and it has become extremely tender, involving the left groin and 

left scrotum and very sensitive to touch, it is erythematous and despite 2 

different antibiotics, the erythema and the pain has worsened and therefore, he 

presented to the Emergency Room for further evaluation and treatment.  He was 

extensively investigated in the Emergency Room and has had lab work as well as 

imaging studies.  His lab work showed his white cell count was 8900.  His 

chemistry was remarkable for hyperglycemia and mild lactic acidosis.  He had had

a CT scan of the abdomen, which showed that the patient has erythema around the 

left testicle with no abscess identified.  The scrotal ultrasound will be 

helpful if there is concern about the testicular vascularity. He has mild to 

moderate splenomegaly, bilateral small fat-containing inguinal hernias and 

hypodense heterogeneous lesion in the left lower pole of the left kidney.  Renal

ultrasound with followup is recommended to rule out a solid component. He has 

had a testicular ultrasound, which basically showed there is no intratesticular 

abnormality.  The right testis measures 3.9 x 2.6 x 2.9 and the left testis 

measures 3.4 x 2.9 x 2.1.  Testicular vascularity is symmetrical and within 

normal limits.  There is a 9 mm simple cyst in the right epididymis and a 1 cm 

simple cyst in the left epididymis. Bilateral small hydroceles are seen.  No 

varicocele, and Doppler evaluation demonstrates bilateral arterial waveforms in 

the testicles with symmetric color Doppler over the testes noted.  The patient 

was admitted with diagnosis of cellulitis that is involving the scrotum and also

the left groin area.  He was started on vancomycin, IS ALLERGIC TO PENICILLIN.



PAST MEDICAL HISTORY:  Significant for hypertension, type 2 diabetes mellitus, 

hyperlipidemia, coronary artery disease, multiple TIAs, chronic obstructive 

pulmonary disease, morbid obesity, obstructive sleep apnea on CPAP, and 

osteoarthritis.



PAST SURGICAL HISTORY:  Significant for left heart catheterization twice and 

left knee surgery.



ALLERGIES:  HE IS ALLERGIC TO PENICILLIN.



MEDICATIONS:  He is currently on sulfamethoxazole trimethoprim 1 tablet twice a 

day, albuterol sulfate 2 puffs every 4-6 hours, baclofen 10 mg twice a day, 

simvastatin 20 mg at bedtime, lisinopril 40 mg once a day, aspirin 81 mg once a 

day, naproxen sodium 550 mg twice a day, hydrocodone/APAP 7.5/325 one tablet 

every 6 hours.  He is on furosemide 40 mg once a day, fluticasone/salmeterol for

Advair Diskus 100/50 one puff twice a day, omeprazole 20 mg daily.



FAMILY HISTORY:  He has 1 brother who  at age of 32 in an accident.  His 

father  at the age of 59 because of lung cancer.  Mother at the age of 66 

because of lung cancer.



SOCIAL HISTORY:  He is , has 3 sons and 2 girls.  He smokes 10 cigarettes

a day.  He does not drink alcohol or use recreational drugs.  He is currently on

disability.



REVIEW OF SYSTEMS:  As per history of present illness.



PHYSICAL EXAMINATION:

GENERAL:  On arrival to the Emergency Room, he looked well and was clearly in no

apparent respiratory distress.  No pallor, jaundice, cyanosis, or thyromegaly.  

No jugular venous distention.  No lower limb edema.

VITAL SIGNS:  His heart rate was 94, blood pressure was 146/95, temperature was 

98.6, respiratory rate 22, and oxygen saturation was 94%.

HEAD, EYES, EARS, NOSE, AND THROAT:  Showed normocephalic, atraumatic.

NECK:  Supple.

HEART:  Showed normal first and second heart sounds.  No gallop, rub, or murmur.

CHEST:  Clear to auscultation. No crepitation or rhonchi.

ABDOMEN:  Distended, soft, nontender.

NEUROLOGIC:  He is grossly intact.

SKIN: His left scrotum compared to the right is markedly erythematous, tender 

with the involvement of the left groin and the upper part of the left thigh 

area.



LABORATORY DATA:  His lab work showed a white cell count of 8900, hemoglobin 

17.1, hematocrit 49.9, MCV 87, and a platelet count of 185,000.  His chemistry, 

his serum sodium was 133, potassium 4.2, chloride 95, bicarbonate 27, anion gap 

of 11, BUN 11, creatinine 1.3.  Estimated GFR was 58 mL per minute.  His glucose

was 389. Lactic acid was 2.6, calcium was 8.5.  Total bilirubin, AST, ALT, 

alkaline phosphatase were normal.  Total protein was 7 and albumin was 3.5.



ASSESSMENT AND PLAN:  This is a 51-year-old  male patient who presented

with cellulitis of the left side of the scrotum, left groin and upper part of 

the left thigh.  He has failed outpatient oral antibiotic therapy with 

doxycycline and Bactrim; and therefore, the patient will be continued on IV 

vancomycin and I will reconcile all his medications.  I do not see that he is on

any medication for his diabetes that seemed to be extremely poorly controlled 

and he might require a midline or a PICC line.







AMM/VIS

DR: AMM/nts   DD: 2021 16:47

DT: 2021 17:09   TID: [id number]

## 2021-12-20 ENCOUNTER — HOSPITAL ENCOUNTER (EMERGENCY)
Dept: HOSPITAL 61 - ER | Age: 51
Discharge: LEFT BEFORE DISCHARGE | End: 2021-12-20
Payer: MEDICARE

## 2021-12-20 VITALS — BODY MASS INDEX: 40.43 KG/M2 | WEIGHT: 315 LBS | HEIGHT: 74 IN

## 2021-12-20 DIAGNOSIS — R10.30: Primary | ICD-10-CM

## 2021-12-20 DIAGNOSIS — Z53.21: ICD-10-CM

## 2022-01-11 ENCOUNTER — HOSPITAL ENCOUNTER (OUTPATIENT)
Dept: HOSPITAL 63 - US | Age: 52
End: 2022-01-11
Attending: PHYSICIAN ASSISTANT
Payer: COMMERCIAL

## 2022-01-11 DIAGNOSIS — N28.9: ICD-10-CM

## 2022-01-11 DIAGNOSIS — K76.0: Primary | ICD-10-CM

## 2022-01-11 PROCEDURE — 76770 US EXAM ABDO BACK WALL COMP: CPT

## 2022-01-11 NOTE — RAD
EXAM: Renal sonogram.



HISTORY: Renal lesion.



TECHNIQUE: Magnetic imaging of the kidneys and bladder was performed.



COMPARISON: CT dated 12/17/2021.



FINDINGS: The exam is limited due to bowel gas and body habitus. The kidneys are normal in size. The 
recently demonstrated lesion of concern involving the the left kidney is not seen sonographically. Th
ere is no hydronephrosis. The ureteral jets are not seen during the exam. The prevoid bladder volume 
is 92 cc. The aorta and inferior vena cava are obscured. There is incidental hepatic steatosis.



IMPRESSION:

1. Limited exam due to body habitus and bowel gas. The recently demonstrated lesion of concern involv
ing the lower pole the left kidney is not seen. This remains indeterminant. There are additional susp
ected cysts on the CT which are also sonographically occult.

2. No acute sonographic finding.

3. Incidental hepatic steatosis.



Electronically signed by: Ebony Bear MD (1/11/2022 11:54 AM) [id number]

## 2022-01-31 ENCOUNTER — HOSPITAL ENCOUNTER (OUTPATIENT)
Dept: HOSPITAL 61 - ENDOS | Age: 52
Discharge: HOME | End: 2022-01-31
Payer: MEDICARE

## 2022-01-31 VITALS — WEIGHT: 315 LBS | BODY MASS INDEX: 41.75 KG/M2 | HEIGHT: 73 IN

## 2022-01-31 VITALS — SYSTOLIC BLOOD PRESSURE: 129 MMHG | DIASTOLIC BLOOD PRESSURE: 73 MMHG

## 2022-01-31 VITALS — DIASTOLIC BLOOD PRESSURE: 62 MMHG | SYSTOLIC BLOOD PRESSURE: 121 MMHG

## 2022-01-31 DIAGNOSIS — F17.210: ICD-10-CM

## 2022-01-31 DIAGNOSIS — Z88.8: ICD-10-CM

## 2022-01-31 DIAGNOSIS — G47.30: ICD-10-CM

## 2022-01-31 DIAGNOSIS — Z98.890: ICD-10-CM

## 2022-01-31 DIAGNOSIS — I25.10: ICD-10-CM

## 2022-01-31 DIAGNOSIS — J43.9: ICD-10-CM

## 2022-01-31 DIAGNOSIS — E78.00: ICD-10-CM

## 2022-01-31 DIAGNOSIS — Z79.899: ICD-10-CM

## 2022-01-31 DIAGNOSIS — Z88.0: ICD-10-CM

## 2022-01-31 DIAGNOSIS — Z20.822: ICD-10-CM

## 2022-01-31 DIAGNOSIS — E11.9: ICD-10-CM

## 2022-01-31 DIAGNOSIS — K31.89: ICD-10-CM

## 2022-01-31 DIAGNOSIS — N40.0: ICD-10-CM

## 2022-01-31 DIAGNOSIS — R13.10: Primary | ICD-10-CM

## 2022-01-31 DIAGNOSIS — F41.9: ICD-10-CM

## 2022-01-31 DIAGNOSIS — E66.9: ICD-10-CM

## 2022-01-31 DIAGNOSIS — K21.00: ICD-10-CM

## 2022-01-31 DIAGNOSIS — I10: ICD-10-CM

## 2022-01-31 PROCEDURE — 43450 DILATE ESOPHAGUS 1/MULT PASS: CPT

## 2022-01-31 PROCEDURE — 87426 SARSCOV CORONAVIRUS AG IA: CPT

## 2022-01-31 PROCEDURE — 43235 EGD DIAGNOSTIC BRUSH WASH: CPT

## 2022-01-31 NOTE — PDOC4
PROCEDURE


Procedure


EGD/dilation





Indication: dysphagia





Meds: per anesthesia





Findings:





E--healed, probably erosive at baseline, reflux at 40 cm.


G--Normal.


D--Normal to second portion.





Dilated with 52F [place]; no resistance.





Elias. well.





IMP: Reflux esophagitis.





REC: Continue PPI; instructed on better timing of dose.


         Resume diet, meds.


         F/u, re-dilate prn.











YANIQUE BUENROSTRO MD          Jan 31, 2022 13:13

## 2022-03-21 ENCOUNTER — HOSPITAL ENCOUNTER (EMERGENCY)
Dept: HOSPITAL 63 - ER | Age: 52
Discharge: HOME | End: 2022-03-21
Payer: COMMERCIAL

## 2022-03-21 VITALS — HEIGHT: 73 IN | WEIGHT: 315 LBS | BODY MASS INDEX: 41.75 KG/M2

## 2022-03-21 VITALS
DIASTOLIC BLOOD PRESSURE: 93 MMHG | SYSTOLIC BLOOD PRESSURE: 138 MMHG | DIASTOLIC BLOOD PRESSURE: 93 MMHG | SYSTOLIC BLOOD PRESSURE: 138 MMHG

## 2022-03-21 DIAGNOSIS — K21.9: ICD-10-CM

## 2022-03-21 DIAGNOSIS — F17.210: ICD-10-CM

## 2022-03-21 DIAGNOSIS — Z88.0: ICD-10-CM

## 2022-03-21 DIAGNOSIS — E78.00: ICD-10-CM

## 2022-03-21 DIAGNOSIS — L02.416: Primary | ICD-10-CM

## 2022-03-21 PROCEDURE — 10060 I&D ABSCESS SIMPLE/SINGLE: CPT

## 2022-03-21 PROCEDURE — 99283 EMERGENCY DEPT VISIT LOW MDM: CPT

## 2022-03-21 RX ADMIN — CLINDAMYCIN HYDROCHLORIDE ONE MG: 150 CAPSULE ORAL at 12:15

## 2022-03-21 RX ADMIN — LIDOCAINE HYDROCHLORIDE ONE ML: 10 INJECTION, SOLUTION INFILTRATION; PERINEURAL at 11:45

## 2022-03-21 RX ADMIN — HYDROCODONE BITARTRATE AND ACETAMINOPHEN ONE TAB: 5; 325 TABLET ORAL at 12:45

## 2022-03-21 NOTE — PHYS DOC
Past History


Past Medical History:  Cancer, GERD, High Cholesterol, Heart Disease


Additional Past Medical Histor:  back pain


 (FERNANDO DELGADO)


Past Surgical History:  Angioplasty, Cancer Surgery, Knee Replacement


 (FERNANDO DELGADO)


Smoking:  Cigarettes


Alcohol Use:  None


Drug Use:  None


 (FERNANDO DELGADO)





General Adult


EDM:


Chief Complaint:  SKIN RASH/ABSCESS





HPI:


HPI:


Patient is a 52-year-old male who presents to the emergency department today for

an abscess to his left inner thigh that occurred 1 week ago.  Patient reports he

had some doxycycline at home leftover prescribed by Dr. Flowers and he started 

taking that.  Patient reports that he has a history of abscess in that area.  He

reports that he has been trying to pop it and applying peroxide.  Patient denies

any fever, nausea, vomiting.  He reports that his tetanus is up-to-date.


 (FERNANDO DELGADO)





Review of Systems:


Review of Systems:


Constitutional: See HPI


GI: See HPI


Integument: HPI


 (FERNANDO DELGADO)





Current Medications:


Current Meds:





Current Medications








 Medications


  (Trade)  Dose


 Ordered  Sig/Sylvester  Start Time


 Stop Time Status Last Admin


Dose Admin


 


 Clindamycin HCl


  (Cleocin)  450 mg  1X  ONCE  3/21/22 12:15


 3/21/22 12:16   





 


 Lidocaine HCl  20 ml  1X  ONCE  3/21/22 11:45


 3/21/22 11:46 DC  











 (FERNANDO DELGADO)





Allergies:


Allergies:





Allergies








Coded Allergies Type Severity Reaction Last Updated Verified


 


  Penicillins Allergy Severe anaphylaxis 11/11/20 Yes








 (FERNANDO DELGADO)





Physical Exam:


PE:





Constitutional: Well developed, well nourished, no acute distress, non-toxic 

appearance. []


HENT: Normocephalic, atraumatic, bilateral external ears normal, oropharynx 

moist, no oral exudates, nose normal. []


Eyes: PERRL, EOMI, conjunctiva normal, no discharge. [] 


Neck: Normal range of motion, no stridor


Cardiovascular: Normal peripheral perfusion


Lungs & Thorax: No work of breathing, no tachypnea


Abdomen: Soft and obese


Skin: Warm, dry, no erythema, no rash. [] 5Centimeter abscess noted to patient's

 left inner thigh without any drainage, mild surrounding redness and warmth


Back: N normal range of motion


Extremities: No tenderness, no cyanosis, no clubbing, ROM intact, no edema. [] 


Neurologic: Alert and oriented X 3, normal motor function, normal sensory funct

ion, no focal deficits noted. []


Psychologic: Affect normal, judgement normal, mood normal. []


 (FERNANDO DELGADO)





Current Patient Data:


Vital Signs:





                                   Vital Signs








  Date Time  Temp Pulse Resp B/P (MAP) Pulse Ox O2 Delivery O2 Flow Rate FiO2


 


3/21/22 11:17 98.4 100 22 138/93 (108) 100 Room Air  








 (FERNANDO DELGADO)





EKG:


EKG:


[]


 (FERNANDO DELGADO)





Radiology/Procedures:


Radiology/Procedures:


[]


 (FERNANDO DELGADO)





Heart Score:


C/O Chest Pain:  N/A


Risk Factors:


Risk Factors:  DM, Current or recent (<one month) smoker, HTN, HLP, family 

history of CAD, obesity.


Risk Scores:


Score 0 - 3:  2.5% MACE over next 6 weeks - Discharge Home


Score 4 - 6:  20.3% MACE over next 6 weeks - Admit for Clinical Observation


Score 7 - 10:  72.7% MACE over next 6 weeks - Early Invasive Strategies


 (FERNANDO DELGADO)





Course & Med Decision Making:


Course & Med Decision Making


Pertinent Labs and Imaging studies reviewed. (See chart for details)





[] Patient presents to the emergency department for an abscess to his left 

thigh.  There is an area of auctions to this area that requires incision and 

drainage.  Abscess was cleansed with Betadine, lidocaine was used, incision and 

drainage performed with serosanguineous drainage.  Presented Telfa dressing 

applied.  Patient educated on dressing changes and wound care.  Is to apply warm

 compresses.  Advised to wash with warm water and mild soap.  Advised to monitor

 for worsening of infection.  I discussed with patient all findings and d

iagnostic testing as well as the need to follow-up with PCP for further 

evaluation and treatment or return to the ER if any new or worsening symptoms.  

Strict return precautions were also discussed at length.  Patient voiced 

understanding and agreement with the plan.  Patient is hemodynamically stable at

 the time of disposition.


 (FERNANDO DELGADO)


Course & Med Decision Making


Did not see patient.  Did not discuss patient with NP.  Generally agree with 

NP's work-up and disposition per note


 (KOFFI KERNS MD)


Dragon Disclaimer:


Dragon Disclaimer:


This electronic medical record was generated, in whole or in part, using a voice

 recognition dictation system.


 (FERNANDO DELGADO)


Incision and Drainage


Indication: Abscess left inner thigh





Procedure: The patient was positioned appropriately and the skin over the 

incision site was Betadine. Local anesthesia was 1% lidocaine.  An incision was 

then made over the apex of the abscess and large amount of serosanguineous 

material was expressed. Loculations were expressed.  The patients tetanus 

status was up-to-date





The patient tolerated the procedure





Complications: None


 (FERNANDO DELGADO)





Departure


Departure:


Impression:  


   Primary Impression:  


   Abscess of left thigh


Disposition:  01 HOME / SELF CARE / HOMELESS


Condition:  GOOD


Referrals:  


RADHA FLOWERS (PCP)


Patient Instructions:  Abscess





Additional Instructions:  


You are seen in the emergency department today for an abscess to your thigh.  

This was incised and drained in the emergency department.  Please keep a 

nonadherent dressing taped on 3 sides to the area.  Please clean this dressing 

twice a day and when soiled.  The abscess may continue to drain at home.  You 

can apply warm compresses to the area to help with drainage.  Please keep the 

area clean with warm water and mild soap.  Do not use any peroxide and do not 

try to pop the abscess any further.  You are being discharged home with an 

antibiotic please make sure you start and finish it completely.  You can 

discontinue the doxycycline that you were taking.  Take Tylenol and ibuprofen 

for any pain.  Monitor your site for any worsening of the infection which 

include increased redness, warmth, swelling, drainage, pain.  I would advise you

 to follow-up with your primary care provider in 2 days for wound recheck.  

Return to the emergency department if you develop worsening of your infection as

 evidenced by the symptoms stated above, high fevers refractory to treatment, 

intractable nausea or vomiting.


Scripts


Clindamycin Hcl (CLINDAMYCIN HCL) 150 Mg Capsule


450 MG PO TID for abscess for 7 Days, #63 CAP 0 Refills


   Prov: FERNANDO DELGADO         3/21/22











FERNANDO DELGADO          Mar 21, 2022 12:24


KOFFI KERNS MD               Mar 21, 2022 13:14

## 2022-03-22 ENCOUNTER — HOSPITAL ENCOUNTER (OUTPATIENT)
Dept: HOSPITAL 63 - US | Age: 52
End: 2022-03-22
Attending: PHYSICIAN ASSISTANT
Payer: COMMERCIAL

## 2022-03-22 DIAGNOSIS — M79.671: ICD-10-CM

## 2022-03-22 DIAGNOSIS — M71.22: Primary | ICD-10-CM

## 2022-03-22 DIAGNOSIS — I25.119: ICD-10-CM

## 2022-03-22 DIAGNOSIS — M79.672: ICD-10-CM

## 2022-03-22 PROCEDURE — 93925 LOWER EXTREMITY STUDY: CPT

## 2022-03-22 NOTE — RAD
Bilateral lower extremity arterial duplex ultrasound.



Pain in bilateral feet. Hypertension. Significant smoking history. History of nicotine dependence.



Comparison: None.



Procedure: Arterial 2D and duplex images are obtained of the lower extremity arteries.



Findings: Normal triphasic waveforms are present in the common femoral artery, superficial femoral ar
rufina, popliteal artery, anterior tibial artery, posterior tibial artery and dorsalis pedis artery. Po
pliteal fossa cyst noted on the left.



IMPRESSION: No sonographic evidence of hemodynamically significant stenosis involving the major arter
ies of the bilateral lower extremities



Electronically signed by: Audi Pike MD (3/22/2022 10:58 AM) [id number]

## 2022-04-05 ENCOUNTER — HOSPITAL ENCOUNTER (EMERGENCY)
Dept: HOSPITAL 63 - ER | Age: 52
Discharge: HOME | End: 2022-04-05
Payer: COMMERCIAL

## 2022-04-05 VITALS — BODY MASS INDEX: 41.75 KG/M2 | HEIGHT: 73 IN | WEIGHT: 315 LBS

## 2022-04-05 VITALS — SYSTOLIC BLOOD PRESSURE: 140 MMHG | DIASTOLIC BLOOD PRESSURE: 80 MMHG

## 2022-04-05 DIAGNOSIS — L02.416: Primary | ICD-10-CM

## 2022-04-05 DIAGNOSIS — K21.9: ICD-10-CM

## 2022-04-05 DIAGNOSIS — Z88.0: ICD-10-CM

## 2022-04-05 DIAGNOSIS — F17.210: ICD-10-CM

## 2022-04-05 DIAGNOSIS — E78.00: ICD-10-CM

## 2022-04-05 PROCEDURE — 99283 EMERGENCY DEPT VISIT LOW MDM: CPT

## 2022-04-05 PROCEDURE — 10060 I&D ABSCESS SIMPLE/SINGLE: CPT

## 2022-04-05 NOTE — PHYS DOC
Past History


Past Medical History:  Cancer, GERD, High Cholesterol, Heart Disease


Additional Past Medical Histor:  back pain


Past Surgical History:  Angioplasty, Cancer Surgery, Knee Replacement


Smoking:  Cigarettes


Alcohol Use:  None


Drug Use:  None





General Adult


EDM:


Chief Complaint:  ABSCESS





HPI:


HPI:


Patient is a 52-year-old male who presents to the emergency department today for

an abscess to his left inner thigh that has been going on for several months.  

Patient reports that he has seen his primary care provider who is discharged him

with doxycycline followed by clindamycin.  Patient reports that his dose of 

clindamycin was 150 mg 3 times daily.  He denies fevers, chills, nausea, 

vomiting.





Review of Systems:


Review of Systems:


Constitutional: See HPI


GI: See HPI


Musculoskeletal: See HPI


Integument: See HPI





Current Medications:


Current Meds:





Current Medications








 Medications


  (Trade)  Dose


 Ordered  Sig/Sylvester  Start Time


 Stop Time Status Last Admin


Dose Admin


 


 Lidocaine HCl  20 ml  1X  ONCE  4/5/22 11:45


 4/5/22 11:46 DC  














Allergies:


Allergies:





Allergies








Coded Allergies Type Severity Reaction Last Updated Verified


 


  Penicillins Allergy Severe anaphylaxis 11/11/20 Yes











Physical Exam:


PE:





Constitutional: Well developed, well nourished, no acute distress, non-toxic 

appearance. []


HENT: Normocephalic, atraumatic, bilateral external ears normal, oropharynx 

moist, no oral exudates, nose normal. []


Eyes: PERRL, EOMI, conjunctiva normal, no discharge. [] 


Neck: Normal range of motion, no stridor


Cardiovascular: Normal peripheral perfusion


Lungs & Thorax: Normal work of breathing, no tachypnea


Abdomen: Obese and soft


Skin: Warm, dry, 4 cm abscess noted to patient's left inner thigh there is no 

surrounding redness or warmth to the area.


Back: Normal range of motion


Extremities: No tenderness, no cyanosis, no clubbing, ROM intact, no edema. [] 


Neurologic: Alert and oriented X 3, normal motor function, normal sensory 

function, no focal deficits noted. []


Psychologic: Affect normal, judgement normal, mood normal. []





Current Patient Data:


Vital Signs:





                                   Vital Signs








  Date Time  Temp Pulse Resp B/P (MAP) Pulse Ox O2 Delivery O2 Flow Rate FiO2


 


4/5/22 11:43 98.1 80 20 140/80 (100) 100   











EKG:


EKG:


[]





Radiology/Procedures:


Radiology/Procedures:


[]





Heart Score:


C/O Chest Pain:  N/A


Risk Factors:


Risk Factors:  DM, Current or recent (<one month) smoker, HTN, HLP, family 

history of CAD, obesity.


Risk Scores:


Score 0 - 3:  2.5% MACE over next 6 weeks - Discharge Home


Score 4 - 6:  20.3% MACE over next 6 weeks - Admit for Clinical Observation


Score 7 - 10:  72.7% MACE over next 6 weeks - Early Invasive Strategies





Course & Med Decision Making:


Course & Med Decision Making


Pertinent Labs and Imaging studies reviewed. (See chart for details)





[] Patient resents to the emergency department for an abscess to his left inner 

thigh.  Patient does have an area of fluctuance which was incised and drained.  

Patient tolerated procedure.  Dressing was placed.  Patient will be discharged 

home on clindamycin, is unsure if patient was taking the appropriate dose of 

clindamycin as he states that it was 150 mg 3 times daily x7 days.  Patient 

advised to follow-up with his primary care provider and dermatology as 

previously scheduled.  I discussed with patient all findings and diagnostic 

testing as well as the need to follow-up with PCP for further evaluation and 

treatment or return to the ER if any new or worsening symptoms.  Strict return 

precautions were also discussed at length.  Patient voiced understanding and 

agreement with the plan.  Patient is hemodynamically stable at the time of 

disposition.





Dragon Disclaimer:


Dragon Disclaimer:


This electronic medical record was generated, in whole or in part, using a voice

 recognition dictation system.


Incision and Drainage


Indication: abscess inner left thigh





Procedure: The patient was positioned appropriately and the skin over the 

incision site was [Betadine. Local anesthesia was sent lidocaine an incision was

 then made over the apex of the abscess I and large amount of purulent material 

was expressed. Loculations were removed the drainage cavity was then left open 

to drain the patients tetanus status up-to-date





The patient tolerated the procedure





Complications: None





Departure


Departure:


Impression:  


   Primary Impression:  


   Abscess of left thigh


Disposition:  01 HOME / SELF CARE / HOMELESS


Condition:  GOOD


Referrals:  


RADHA FLOWERS (PCP)


Patient Instructions:  Abscess, Abscess, Care After





Additional Instructions:  


You are seen in the emergency department today for an abscess.  This was drained

 in the emergency department.  Please keep this area clean and dry.  Keep the 

dressing in place and change it twice a day and when soiled.  Take Tylenol and 

ibuprofen for your pain.  You can continue to apply warm compresses to the area 

to help with drainage.  You are being discharged home with an antibiotic.  

Please start and finish it completely.  Monitor for any worsening of the 

infection as evidenced by increased redness, swelling, drainage or pain.  Please

 follow-up with your primary care provider tomorrow regarding your ER visit.  If

 you require dermatology referral you can follow-up with [facility] dermatology in 

[place] by calling [phone].  As we discussed, you can also contact your

 insurance to see which dermatology clinic is covered.  Return to the emergency 

department if you develop worsening of your infection, high fevers refractory to

 treatment, intractable nausea or vomiting.


Scripts


Clindamycin Hcl (CLINDAMYCIN HCL) 150 Mg Capsule


450 MG PO TID for abscess for 7 Days, #63 CAP 0 Refills


   Prov: FERNANDO DELGADO         4/5/22











FERNANDO DELGADO           Apr 5, 2022 12:03 If you are a smoker, it is important for your health to stop smoking. Please be aware that second hand smoke is also harmful.